# Patient Record
Sex: FEMALE | Race: WHITE | NOT HISPANIC OR LATINO
[De-identification: names, ages, dates, MRNs, and addresses within clinical notes are randomized per-mention and may not be internally consistent; named-entity substitution may affect disease eponyms.]

---

## 2019-06-23 ENCOUNTER — FORM ENCOUNTER (OUTPATIENT)
Age: 59
End: 2019-06-23

## 2020-06-30 ENCOUNTER — FORM ENCOUNTER (OUTPATIENT)
Age: 60
End: 2020-06-30

## 2020-07-16 ENCOUNTER — FORM ENCOUNTER (OUTPATIENT)
Age: 60
End: 2020-07-16

## 2020-09-15 ENCOUNTER — APPOINTMENT (OUTPATIENT)
Dept: ORTHOPEDIC SURGERY | Facility: CLINIC | Age: 60
End: 2020-09-15
Payer: MEDICARE

## 2020-09-15 VITALS
HEIGHT: 63 IN | HEART RATE: 74 BPM | BODY MASS INDEX: 48.55 KG/M2 | WEIGHT: 274 LBS | SYSTOLIC BLOOD PRESSURE: 132 MMHG | DIASTOLIC BLOOD PRESSURE: 74 MMHG | TEMPERATURE: 97.4 F

## 2020-09-15 DIAGNOSIS — Z80.1 FAMILY HISTORY OF MALIGNANT NEOPLASM OF TRACHEA, BRONCHUS AND LUNG: ICD-10-CM

## 2020-09-15 DIAGNOSIS — Z87.39 PERSONAL HISTORY OF OTHER DISEASES OF THE MUSCULOSKELETAL SYSTEM AND CONNECTIVE TISSUE: ICD-10-CM

## 2020-09-15 DIAGNOSIS — Z87.891 PERSONAL HISTORY OF NICOTINE DEPENDENCE: ICD-10-CM

## 2020-09-15 DIAGNOSIS — Z86.39 PERSONAL HISTORY OF OTHER ENDOCRINE, NUTRITIONAL AND METABOLIC DISEASE: ICD-10-CM

## 2020-09-15 DIAGNOSIS — I87.2 VENOUS INSUFFICIENCY (CHRONIC) (PERIPHERAL): ICD-10-CM

## 2020-09-15 PROCEDURE — 73562 X-RAY EXAM OF KNEE 3: CPT | Mod: 50

## 2020-09-15 PROCEDURE — 73521 X-RAY EXAM HIPS BI 2 VIEWS: CPT

## 2020-09-15 PROCEDURE — 99203 OFFICE O/P NEW LOW 30 MIN: CPT

## 2020-09-15 RX ORDER — OMEPRAZOLE 40 MG/1
40 CAPSULE, DELAYED RELEASE ORAL
Refills: 0 | Status: ACTIVE | COMMUNITY

## 2020-09-19 ENCOUNTER — OUTPATIENT (OUTPATIENT)
Dept: OUTPATIENT SERVICES | Facility: HOSPITAL | Age: 60
LOS: 1 days | End: 2020-09-19
Payer: COMMERCIAL

## 2020-09-19 ENCOUNTER — APPOINTMENT (OUTPATIENT)
Dept: CT IMAGING | Facility: CLINIC | Age: 60
End: 2020-09-19
Payer: MEDICARE

## 2020-09-19 ENCOUNTER — RESULT REVIEW (OUTPATIENT)
Age: 60
End: 2020-09-19

## 2020-09-19 DIAGNOSIS — Z00.8 ENCOUNTER FOR OTHER GENERAL EXAMINATION: ICD-10-CM

## 2020-09-19 PROCEDURE — 76376 3D RENDER W/INTRP POSTPROCES: CPT

## 2020-09-19 PROCEDURE — 73700 CT LOWER EXTREMITY W/O DYE: CPT

## 2020-09-19 PROCEDURE — 76376 3D RENDER W/INTRP POSTPROCES: CPT | Mod: 26

## 2020-09-19 PROCEDURE — 73700 CT LOWER EXTREMITY W/O DYE: CPT | Mod: 26,RT

## 2020-11-20 ENCOUNTER — NON-APPOINTMENT (OUTPATIENT)
Age: 60
End: 2020-11-20

## 2020-11-23 ENCOUNTER — APPOINTMENT (OUTPATIENT)
Dept: ORTHOPEDIC SURGERY | Facility: CLINIC | Age: 60
End: 2020-11-23
Payer: MEDICARE

## 2020-11-23 DIAGNOSIS — M25.561 PAIN IN RIGHT KNEE: ICD-10-CM

## 2020-11-23 PROCEDURE — 99213 OFFICE O/P EST LOW 20 MIN: CPT

## 2020-11-23 PROCEDURE — 73562 X-RAY EXAM OF KNEE 3: CPT | Mod: LT

## 2020-12-28 ENCOUNTER — APPOINTMENT (OUTPATIENT)
Dept: ORTHOPEDIC SURGERY | Facility: HOSPITAL | Age: 60
End: 2020-12-28

## 2021-05-10 ENCOUNTER — NON-APPOINTMENT (OUTPATIENT)
Age: 61
End: 2021-05-10

## 2021-05-10 ENCOUNTER — RX RENEWAL (OUTPATIENT)
Age: 61
End: 2021-05-10

## 2021-05-22 ENCOUNTER — APPOINTMENT (OUTPATIENT)
Dept: ORTHOPEDIC SURGERY | Facility: CLINIC | Age: 61
End: 2021-05-22
Payer: MEDICARE

## 2021-05-22 VITALS
HEART RATE: 65 BPM | TEMPERATURE: 98.9 F | DIASTOLIC BLOOD PRESSURE: 72 MMHG | SYSTOLIC BLOOD PRESSURE: 125 MMHG | HEIGHT: 63 IN | WEIGHT: 268 LBS | BODY MASS INDEX: 47.48 KG/M2

## 2021-05-22 DIAGNOSIS — T84.033A MECHANICAL LOOSENING OF INTERNAL LEFT KNEE PROSTHETIC JOINT, INITIAL ENCOUNTER: ICD-10-CM

## 2021-05-22 DIAGNOSIS — T84.032A MECHANICAL LOOSENING OF INTERNAL RIGHT KNEE PROSTHETIC JOINT, INITIAL ENCOUNTER: ICD-10-CM

## 2021-05-22 PROCEDURE — 99213 OFFICE O/P EST LOW 20 MIN: CPT

## 2021-05-22 PROCEDURE — 73562 X-RAY EXAM OF KNEE 3: CPT | Mod: RT

## 2021-05-22 PROCEDURE — 99072 ADDL SUPL MATRL&STAF TM PHE: CPT

## 2021-05-24 PROBLEM — T84.032A MECHANICAL LOOSENING OF INTERNAL RIGHT KNEE PROSTHETIC JOINT, INITIAL ENCOUNTER: Status: ACTIVE | Noted: 2020-09-15

## 2021-06-05 ENCOUNTER — TRANSCRIPTION ENCOUNTER (OUTPATIENT)
Age: 61
End: 2021-06-05

## 2021-06-28 ENCOUNTER — OUTPATIENT (OUTPATIENT)
Dept: OUTPATIENT SERVICES | Facility: HOSPITAL | Age: 61
LOS: 1 days | End: 2021-06-28
Payer: COMMERCIAL

## 2021-06-28 VITALS
WEIGHT: 268.08 LBS | HEIGHT: 63.5 IN | SYSTOLIC BLOOD PRESSURE: 130 MMHG | OXYGEN SATURATION: 96 % | HEART RATE: 64 BPM | TEMPERATURE: 99 F | DIASTOLIC BLOOD PRESSURE: 76 MMHG | RESPIRATION RATE: 16 BRPM

## 2021-06-28 DIAGNOSIS — Z01.818 ENCOUNTER FOR OTHER PREPROCEDURAL EXAMINATION: ICD-10-CM

## 2021-06-28 DIAGNOSIS — Z98.890 OTHER SPECIFIED POSTPROCEDURAL STATES: Chronic | ICD-10-CM

## 2021-06-28 DIAGNOSIS — Z90.710 ACQUIRED ABSENCE OF BOTH CERVIX AND UTERUS: Chronic | ICD-10-CM

## 2021-06-28 DIAGNOSIS — Z96.653 PRESENCE OF ARTIFICIAL KNEE JOINT, BILATERAL: Chronic | ICD-10-CM

## 2021-06-28 DIAGNOSIS — M25.561 PAIN IN RIGHT KNEE: ICD-10-CM

## 2021-06-28 DIAGNOSIS — I83.90 ASYMPTOMATIC VARICOSE VEINS OF UNSPECIFIED LOWER EXTREMITY: Chronic | ICD-10-CM

## 2021-06-28 DIAGNOSIS — Z20.828 CONTACT WITH AND (SUSPECTED) EXPOSURE TO OTHER VIRAL COMMUNICABLE DISEASES: ICD-10-CM

## 2021-06-28 DIAGNOSIS — T84.032A MECHANICAL LOOSENING OF INTERNAL RIGHT KNEE PROSTHETIC JOINT, INITIAL ENCOUNTER: ICD-10-CM

## 2021-06-28 DIAGNOSIS — Z98.891 HISTORY OF UTERINE SCAR FROM PREVIOUS SURGERY: Chronic | ICD-10-CM

## 2021-06-28 LAB
A1C WITH ESTIMATED AVERAGE GLUCOSE RESULT: 6.4 % — HIGH (ref 4–5.6)
ALBUMIN SERPL ELPH-MCNC: 3.4 G/DL — SIGNIFICANT CHANGE UP (ref 3.3–5)
ALP SERPL-CCNC: 120 U/L — SIGNIFICANT CHANGE UP (ref 30–120)
ALT FLD-CCNC: 36 U/L DA — SIGNIFICANT CHANGE UP (ref 10–60)
ANION GAP SERPL CALC-SCNC: 9 MMOL/L — SIGNIFICANT CHANGE UP (ref 5–17)
APTT BLD: 29.2 SEC — SIGNIFICANT CHANGE UP (ref 27.5–35.5)
AST SERPL-CCNC: 29 U/L — SIGNIFICANT CHANGE UP (ref 10–40)
BILIRUB SERPL-MCNC: 0.4 MG/DL — SIGNIFICANT CHANGE UP (ref 0.2–1.2)
BLD GP AB SCN SERPL QL: SIGNIFICANT CHANGE UP
BUN SERPL-MCNC: 15 MG/DL — SIGNIFICANT CHANGE UP (ref 7–23)
CALCIUM SERPL-MCNC: 8.6 MG/DL — SIGNIFICANT CHANGE UP (ref 8.4–10.5)
CHLORIDE SERPL-SCNC: 102 MMOL/L — SIGNIFICANT CHANGE UP (ref 96–108)
CO2 SERPL-SCNC: 30 MMOL/L — SIGNIFICANT CHANGE UP (ref 22–31)
CREAT SERPL-MCNC: 0.83 MG/DL — SIGNIFICANT CHANGE UP (ref 0.5–1.3)
D DIMER BLD IA.RAPID-MCNC: 359 NG/ML DDU — HIGH
ESTIMATED AVERAGE GLUCOSE: 137 MG/DL — HIGH (ref 68–114)
GLUCOSE SERPL-MCNC: 129 MG/DL — HIGH (ref 70–99)
HCT VFR BLD CALC: 38.9 % — SIGNIFICANT CHANGE UP (ref 34.5–45)
HGB BLD-MCNC: 12.6 G/DL — SIGNIFICANT CHANGE UP (ref 11.5–15.5)
INR BLD: 1.07 RATIO — SIGNIFICANT CHANGE UP (ref 0.88–1.16)
MCHC RBC-ENTMCNC: 27.6 PG — SIGNIFICANT CHANGE UP (ref 27–34)
MCHC RBC-ENTMCNC: 32.4 GM/DL — SIGNIFICANT CHANGE UP (ref 32–36)
MCV RBC AUTO: 85.3 FL — SIGNIFICANT CHANGE UP (ref 80–100)
MRSA PCR RESULT.: SIGNIFICANT CHANGE UP
NRBC # BLD: 0 /100 WBCS — SIGNIFICANT CHANGE UP (ref 0–0)
PLATELET # BLD AUTO: 276 K/UL — SIGNIFICANT CHANGE UP (ref 150–400)
POTASSIUM SERPL-MCNC: 4.3 MMOL/L — SIGNIFICANT CHANGE UP (ref 3.5–5.3)
POTASSIUM SERPL-SCNC: 4.3 MMOL/L — SIGNIFICANT CHANGE UP (ref 3.5–5.3)
PROT SERPL-MCNC: 6.5 G/DL — SIGNIFICANT CHANGE UP (ref 6–8.3)
PROTHROM AB SERPL-ACNC: 12.9 SEC — SIGNIFICANT CHANGE UP (ref 10.6–13.6)
RBC # BLD: 4.56 M/UL — SIGNIFICANT CHANGE UP (ref 3.8–5.2)
RBC # FLD: 14.1 % — SIGNIFICANT CHANGE UP (ref 10.3–14.5)
S AUREUS DNA NOSE QL NAA+PROBE: SIGNIFICANT CHANGE UP
SODIUM SERPL-SCNC: 141 MMOL/L — SIGNIFICANT CHANGE UP (ref 135–145)
WBC # BLD: 9.49 K/UL — SIGNIFICANT CHANGE UP (ref 3.8–10.5)
WBC # FLD AUTO: 9.49 K/UL — SIGNIFICANT CHANGE UP (ref 3.8–10.5)

## 2021-06-28 PROCEDURE — G0463: CPT

## 2021-06-28 PROCEDURE — 93005 ELECTROCARDIOGRAM TRACING: CPT

## 2021-06-28 PROCEDURE — 93010 ELECTROCARDIOGRAM REPORT: CPT

## 2021-06-28 RX ORDER — DEXTROSE 50 % IN WATER 50 %
25 SYRINGE (ML) INTRAVENOUS ONCE
Refills: 0 | Status: DISCONTINUED | OUTPATIENT
Start: 2021-07-12 | End: 2021-07-14

## 2021-06-28 RX ORDER — DEXTROSE 50 % IN WATER 50 %
15 SYRINGE (ML) INTRAVENOUS ONCE
Refills: 0 | Status: DISCONTINUED | OUTPATIENT
Start: 2021-07-12 | End: 2021-07-14

## 2021-06-28 RX ORDER — GLUCAGON INJECTION, SOLUTION 0.5 MG/.1ML
1 INJECTION, SOLUTION SUBCUTANEOUS ONCE
Refills: 0 | Status: DISCONTINUED | OUTPATIENT
Start: 2021-07-12 | End: 2021-07-14

## 2021-06-28 RX ORDER — DEXTROSE 50 % IN WATER 50 %
12.5 SYRINGE (ML) INTRAVENOUS ONCE
Refills: 0 | Status: DISCONTINUED | OUTPATIENT
Start: 2021-07-12 | End: 2021-07-14

## 2021-06-28 NOTE — H&P PST ADULT - NSICDXPROBLEM_GEN_ALL_CORE_FT
PROBLEM DIAGNOSES  Problem: Pain in right knee  Assessment and Plan: FULL REVISION RIGHT TOTAL KNEE AUGMENTED PATELLSA SYSTEM  MEDICAL, PULMONARY, CARDIAC, VASCULAR CLEARANCES

## 2021-06-28 NOTE — H&P PST ADULT - NSICDXFAMILYHX_GEN_ALL_CORE_FT
FAMILY HISTORY:  Father  Still living? No  Family hx of melanoma, Age at diagnosis: Age Unknown    Mother  Still living? No  FHx: lung cancer, Age at diagnosis: Age Unknown    Sibling  Still living? No  Family hx of lung cancer, Age at diagnosis: Age Unknown

## 2021-06-28 NOTE — H&P PST ADULT - VENOUS THROMBOEMBOLISM CURRENT STATUS
(1) serious lung disease, including pneumonia (within 1 month)/(1) varicose veins/(1) other risk factor (includes escalating BMI, pack-years of smoking, diabetes requiring insulin, chemotherapy, female gender and length of surgery)

## 2021-06-28 NOTE — H&P PST ADULT - VASCULAR DETAILS
LE/feet warm to touch, unable to palpate pulses, numerous varicosities.  Recent dopplers done by vascular surgeon in April and will be forwarded.

## 2021-06-28 NOTE — H&P PST ADULT - HISTORY OF PRESENT ILLNESS
60 yo female reports 9 month history of right knee pain that began after a fall.  Patient had a B/Lt TKR 16 years ago.  Patient states that she has significant bone loss in the right knee.  As of the past week pain has become constant and ROM is limited.  No current analgesics.

## 2021-06-28 NOTE — H&P PST ADULT - NSICDXPASTSURGICALHX_GEN_ALL_CORE_FT
PAST SURGICAL HISTORY:  H/O:      H/O: hysterectomy     History of myringotomy 2006    S/P FESS (functional endoscopic sinus surgery) 2007    Status post bilateral knee replacements      PAST SURGICAL HISTORY:  H/O:      H/O: hysterectomy     History of myringotomy 2006    S/P FESS (functional endoscopic sinus surgery) 2007    Status post bilateral knee replacements     Varicose vein of leg right leg, excised 2021 thigh to knee

## 2021-06-28 NOTE — H&P PST ADULT - NSICDXPASTMEDICALHX_GEN_ALL_CORE_FT
PAST MEDICAL HISTORY:  Acid reflux     History of fibromyalgia     History of varicose veins     Hypothyroid     Osteoarthritis     Pneumonia due to COVID-19 virus 3/2021  inpatient at [facility]    Type 2 diabetes mellitus     Uterine hemorrhage     Venous insufficiency of leg      PAST MEDICAL HISTORY:  Acid reflux     History of fibromyalgia     History of varicose veins     Hypothyroid     Osteoarthritis     Pneumonia due to COVID-19 virus 3/2021  inpatient at [facility]    Severe obesity (BMI >= 40)     Type 2 diabetes mellitus     Uterine hemorrhage     Venous insufficiency of leg

## 2021-06-28 NOTE — H&P PST ADULT - MUSCULOSKELETAL
details… no calf tenderness/decreased ROM/decreased ROM due to pain/diminished strength detailed exam

## 2021-07-01 ENCOUNTER — FORM ENCOUNTER (OUTPATIENT)
Age: 61
End: 2021-07-01

## 2021-07-07 PROBLEM — E66.01 MORBID (SEVERE) OBESITY DUE TO EXCESS CALORIES: Chronic | Status: ACTIVE | Noted: 2021-06-28

## 2021-07-07 PROBLEM — Z86.79 PERSONAL HISTORY OF OTHER DISEASES OF THE CIRCULATORY SYSTEM: Chronic | Status: ACTIVE | Noted: 2021-06-28

## 2021-07-07 PROBLEM — E11.9 TYPE 2 DIABETES MELLITUS WITHOUT COMPLICATIONS: Chronic | Status: ACTIVE | Noted: 2021-06-28

## 2021-07-07 PROBLEM — M19.90 UNSPECIFIED OSTEOARTHRITIS, UNSPECIFIED SITE: Chronic | Status: ACTIVE | Noted: 2021-06-28

## 2021-07-07 PROBLEM — I87.2 VENOUS INSUFFICIENCY (CHRONIC) (PERIPHERAL): Chronic | Status: ACTIVE | Noted: 2021-06-28

## 2021-07-07 PROBLEM — N93.9 ABNORMAL UTERINE AND VAGINAL BLEEDING, UNSPECIFIED: Chronic | Status: ACTIVE | Noted: 2021-06-28

## 2021-07-07 PROBLEM — K21.9 GASTRO-ESOPHAGEAL REFLUX DISEASE WITHOUT ESOPHAGITIS: Chronic | Status: ACTIVE | Noted: 2021-06-28

## 2021-07-07 PROBLEM — E03.9 HYPOTHYROIDISM, UNSPECIFIED: Chronic | Status: ACTIVE | Noted: 2021-06-28

## 2021-07-07 PROBLEM — Z87.39 PERSONAL HISTORY OF OTHER DISEASES OF THE MUSCULOSKELETAL SYSTEM AND CONNECTIVE TISSUE: Chronic | Status: ACTIVE | Noted: 2021-06-28

## 2021-07-07 PROBLEM — U07.1 COVID-19: Chronic | Status: ACTIVE | Noted: 2021-06-28

## 2021-07-09 ENCOUNTER — OUTPATIENT (OUTPATIENT)
Dept: OUTPATIENT SERVICES | Facility: HOSPITAL | Age: 61
LOS: 1 days | End: 2021-07-09
Payer: COMMERCIAL

## 2021-07-09 DIAGNOSIS — Z98.890 OTHER SPECIFIED POSTPROCEDURAL STATES: Chronic | ICD-10-CM

## 2021-07-09 DIAGNOSIS — Z98.891 HISTORY OF UTERINE SCAR FROM PREVIOUS SURGERY: Chronic | ICD-10-CM

## 2021-07-09 DIAGNOSIS — Z90.710 ACQUIRED ABSENCE OF BOTH CERVIX AND UTERUS: Chronic | ICD-10-CM

## 2021-07-09 DIAGNOSIS — Z96.653 PRESENCE OF ARTIFICIAL KNEE JOINT, BILATERAL: Chronic | ICD-10-CM

## 2021-07-09 DIAGNOSIS — Z20.828 CONTACT WITH AND (SUSPECTED) EXPOSURE TO OTHER VIRAL COMMUNICABLE DISEASES: ICD-10-CM

## 2021-07-09 DIAGNOSIS — I83.90 ASYMPTOMATIC VARICOSE VEINS OF UNSPECIFIED LOWER EXTREMITY: Chronic | ICD-10-CM

## 2021-07-09 LAB — SARS-COV-2 RNA SPEC QL NAA+PROBE: SIGNIFICANT CHANGE UP

## 2021-07-09 PROCEDURE — U0003: CPT

## 2021-07-09 PROCEDURE — U0005: CPT

## 2021-07-09 NOTE — PATIENT PROFILE ADULT - FOOD INSECURITY
DNR Order/Artificial nutrition trial/Intubation trial/IV fluid trial No blood draws/No artificial nutrition/Comfort measures only/No antibiotics/DNR Order/No IV fluids no

## 2021-07-11 ENCOUNTER — TRANSCRIPTION ENCOUNTER (OUTPATIENT)
Age: 61
End: 2021-07-11

## 2021-07-12 ENCOUNTER — APPOINTMENT (OUTPATIENT)
Dept: ORTHOPEDIC SURGERY | Facility: HOSPITAL | Age: 61
End: 2021-07-12

## 2021-07-12 ENCOUNTER — INPATIENT (INPATIENT)
Facility: HOSPITAL | Age: 61
LOS: 1 days | Discharge: HOME | DRG: 467 | End: 2021-07-14
Attending: ORTHOPAEDIC SURGERY | Admitting: ORTHOPAEDIC SURGERY
Payer: COMMERCIAL

## 2021-07-12 ENCOUNTER — RESULT REVIEW (OUTPATIENT)
Age: 61
End: 2021-07-12

## 2021-07-12 VITALS
OXYGEN SATURATION: 100 % | HEIGHT: 63 IN | HEART RATE: 62 BPM | WEIGHT: 264.78 LBS | TEMPERATURE: 98 F | SYSTOLIC BLOOD PRESSURE: 134 MMHG | RESPIRATION RATE: 17 BRPM | DIASTOLIC BLOOD PRESSURE: 55 MMHG

## 2021-07-12 DIAGNOSIS — Z90.710 ACQUIRED ABSENCE OF BOTH CERVIX AND UTERUS: Chronic | ICD-10-CM

## 2021-07-12 DIAGNOSIS — M25.561 PAIN IN RIGHT KNEE: ICD-10-CM

## 2021-07-12 DIAGNOSIS — Z98.890 OTHER SPECIFIED POSTPROCEDURAL STATES: Chronic | ICD-10-CM

## 2021-07-12 DIAGNOSIS — Z29.9 ENCOUNTER FOR PROPHYLACTIC MEASURES, UNSPECIFIED: ICD-10-CM

## 2021-07-12 DIAGNOSIS — E78.5 HYPERLIPIDEMIA, UNSPECIFIED: ICD-10-CM

## 2021-07-12 DIAGNOSIS — R73.03 PREDIABETES: ICD-10-CM

## 2021-07-12 DIAGNOSIS — Z96.653 PRESENCE OF ARTIFICIAL KNEE JOINT, BILATERAL: Chronic | ICD-10-CM

## 2021-07-12 DIAGNOSIS — T84.032A MECHANICAL LOOSENING OF INTERNAL RIGHT KNEE PROSTHETIC JOINT, INITIAL ENCOUNTER: ICD-10-CM

## 2021-07-12 DIAGNOSIS — K21.9 GASTRO-ESOPHAGEAL REFLUX DISEASE WITHOUT ESOPHAGITIS: ICD-10-CM

## 2021-07-12 DIAGNOSIS — T84.018A BROKEN INTERNAL JOINT PROSTHESIS, OTHER SITE, INITIAL ENCOUNTER: ICD-10-CM

## 2021-07-12 DIAGNOSIS — E03.9 HYPOTHYROIDISM, UNSPECIFIED: ICD-10-CM

## 2021-07-12 DIAGNOSIS — I83.90 ASYMPTOMATIC VARICOSE VEINS OF UNSPECIFIED LOWER EXTREMITY: Chronic | ICD-10-CM

## 2021-07-12 DIAGNOSIS — Z98.891 HISTORY OF UTERINE SCAR FROM PREVIOUS SURGERY: Chronic | ICD-10-CM

## 2021-07-12 DIAGNOSIS — M79.7 FIBROMYALGIA: ICD-10-CM

## 2021-07-12 LAB
GLUCOSE BLDC GLUCOMTR-MCNC: 105 MG/DL — HIGH (ref 70–99)
GLUCOSE BLDC GLUCOMTR-MCNC: 152 MG/DL — HIGH (ref 70–99)
HCT VFR BLD CALC: 36.6 % — SIGNIFICANT CHANGE UP (ref 34.5–45)
HGB BLD-MCNC: 11.9 G/DL — SIGNIFICANT CHANGE UP (ref 11.5–15.5)
MCHC RBC-ENTMCNC: 27.4 PG — SIGNIFICANT CHANGE UP (ref 27–34)
MCHC RBC-ENTMCNC: 32.5 GM/DL — SIGNIFICANT CHANGE UP (ref 32–36)
MCV RBC AUTO: 84.1 FL — SIGNIFICANT CHANGE UP (ref 80–100)
NRBC # BLD: 0 /100 WBCS — SIGNIFICANT CHANGE UP (ref 0–0)
PLATELET # BLD AUTO: 286 K/UL — SIGNIFICANT CHANGE UP (ref 150–400)
RBC # BLD: 4.35 M/UL — SIGNIFICANT CHANGE UP (ref 3.8–5.2)
RBC # FLD: 13.7 % — SIGNIFICANT CHANGE UP (ref 10.3–14.5)
WBC # BLD: 20.41 K/UL — HIGH (ref 3.8–10.5)
WBC # FLD AUTO: 20.41 K/UL — HIGH (ref 3.8–10.5)

## 2021-07-12 PROCEDURE — 88305 TISSUE EXAM BY PATHOLOGIST: CPT | Mod: 26

## 2021-07-12 PROCEDURE — 88300 SURGICAL PATH GROSS: CPT | Mod: 26

## 2021-07-12 PROCEDURE — 27487 REVISE/REPLACE KNEE JOINT: CPT | Mod: RT

## 2021-07-12 PROCEDURE — 73560 X-RAY EXAM OF KNEE 1 OR 2: CPT | Mod: 26,RT

## 2021-07-12 PROCEDURE — 99222 1ST HOSP IP/OBS MODERATE 55: CPT

## 2021-07-12 PROCEDURE — 88311 DECALCIFY TISSUE: CPT | Mod: 26

## 2021-07-12 PROCEDURE — 27487 REVISE/REPLACE KNEE JOINT: CPT | Mod: AS,RT

## 2021-07-12 RX ORDER — OXYCODONE HYDROCHLORIDE 5 MG/1
10 TABLET ORAL
Refills: 0 | Status: DISCONTINUED | OUTPATIENT
Start: 2021-07-12 | End: 2021-07-14

## 2021-07-12 RX ORDER — TRANEXAMIC ACID 100 MG/ML
1000 INJECTION, SOLUTION INTRAVENOUS ONCE
Refills: 0 | Status: DISCONTINUED | OUTPATIENT
Start: 2021-07-12 | End: 2021-07-12

## 2021-07-12 RX ORDER — PANTOPRAZOLE SODIUM 20 MG/1
40 TABLET, DELAYED RELEASE ORAL
Refills: 0 | Status: DISCONTINUED | OUTPATIENT
Start: 2021-07-12 | End: 2021-07-14

## 2021-07-12 RX ORDER — APIXABAN 2.5 MG/1
5 TABLET, FILM COATED ORAL EVERY 12 HOURS
Refills: 0 | Status: DISCONTINUED | OUTPATIENT
Start: 2021-07-14 | End: 2021-07-14

## 2021-07-12 RX ORDER — DEXTROSE 50 % IN WATER 50 %
15 SYRINGE (ML) INTRAVENOUS ONCE
Refills: 0 | Status: DISCONTINUED | OUTPATIENT
Start: 2021-07-12 | End: 2021-07-14

## 2021-07-12 RX ORDER — APIXABAN 2.5 MG/1
2.5 TABLET, FILM COATED ORAL EVERY 12 HOURS
Refills: 0 | Status: COMPLETED | OUTPATIENT
Start: 2021-07-13 | End: 2021-07-13

## 2021-07-12 RX ORDER — FLUOXETINE HCL 10 MG
20 CAPSULE ORAL DAILY
Refills: 0 | Status: DISCONTINUED | OUTPATIENT
Start: 2021-07-12 | End: 2021-07-14

## 2021-07-12 RX ORDER — GLUCAGON INJECTION, SOLUTION 0.5 MG/.1ML
1 INJECTION, SOLUTION SUBCUTANEOUS ONCE
Refills: 0 | Status: DISCONTINUED | OUTPATIENT
Start: 2021-07-12 | End: 2021-07-14

## 2021-07-12 RX ORDER — CEFAZOLIN SODIUM 1 G
2000 VIAL (EA) INJECTION EVERY 8 HOURS
Refills: 0 | Status: DISCONTINUED | OUTPATIENT
Start: 2021-07-12 | End: 2021-07-12

## 2021-07-12 RX ORDER — ACETAMINOPHEN 500 MG
1000 TABLET ORAL ONCE
Refills: 0 | Status: COMPLETED | OUTPATIENT
Start: 2021-07-12 | End: 2021-07-12

## 2021-07-12 RX ORDER — SODIUM CHLORIDE 9 MG/ML
500 INJECTION INTRAMUSCULAR; INTRAVENOUS; SUBCUTANEOUS ONCE
Refills: 0 | Status: COMPLETED | OUTPATIENT
Start: 2021-07-12 | End: 2021-07-12

## 2021-07-12 RX ORDER — TRANEXAMIC ACID 100 MG/ML
1 INJECTION, SOLUTION INTRAVENOUS ONCE
Refills: 0 | Status: COMPLETED | OUTPATIENT
Start: 2021-07-12 | End: 2021-07-12

## 2021-07-12 RX ORDER — SODIUM CHLORIDE 9 MG/ML
1000 INJECTION, SOLUTION INTRAVENOUS
Refills: 0 | Status: DISCONTINUED | OUTPATIENT
Start: 2021-07-12 | End: 2021-07-14

## 2021-07-12 RX ORDER — ONDANSETRON 8 MG/1
4 TABLET, FILM COATED ORAL EVERY 6 HOURS
Refills: 0 | Status: DISCONTINUED | OUTPATIENT
Start: 2021-07-12 | End: 2021-07-14

## 2021-07-12 RX ORDER — CELECOXIB 200 MG/1
100 CAPSULE ORAL EVERY 12 HOURS
Refills: 0 | Status: DISCONTINUED | OUTPATIENT
Start: 2021-07-13 | End: 2021-07-14

## 2021-07-12 RX ORDER — ONDANSETRON 8 MG/1
4 TABLET, FILM COATED ORAL ONCE
Refills: 0 | Status: DISCONTINUED | OUTPATIENT
Start: 2021-07-12 | End: 2021-07-12

## 2021-07-12 RX ORDER — METFORMIN HYDROCHLORIDE 850 MG/1
750 TABLET ORAL DAILY
Refills: 0 | Status: DISCONTINUED | OUTPATIENT
Start: 2021-07-13 | End: 2021-07-14

## 2021-07-12 RX ORDER — DEXTROSE 50 % IN WATER 50 %
25 SYRINGE (ML) INTRAVENOUS ONCE
Refills: 0 | Status: DISCONTINUED | OUTPATIENT
Start: 2021-07-12 | End: 2021-07-14

## 2021-07-12 RX ORDER — HYDROMORPHONE HYDROCHLORIDE 2 MG/ML
0.5 INJECTION INTRAMUSCULAR; INTRAVENOUS; SUBCUTANEOUS
Refills: 0 | Status: DISCONTINUED | OUTPATIENT
Start: 2021-07-12 | End: 2021-07-14

## 2021-07-12 RX ORDER — APREPITANT 80 MG/1
40 CAPSULE ORAL ONCE
Refills: 0 | Status: COMPLETED | OUTPATIENT
Start: 2021-07-12 | End: 2021-07-12

## 2021-07-12 RX ORDER — CHLORHEXIDINE GLUCONATE 213 G/1000ML
1 SOLUTION TOPICAL ONCE
Refills: 0 | Status: COMPLETED | OUTPATIENT
Start: 2021-07-12 | End: 2021-07-12

## 2021-07-12 RX ORDER — OXYCODONE HYDROCHLORIDE 5 MG/1
5 TABLET ORAL
Refills: 0 | Status: DISCONTINUED | OUTPATIENT
Start: 2021-07-12 | End: 2021-07-14

## 2021-07-12 RX ORDER — DEXTROSE 50 % IN WATER 50 %
12.5 SYRINGE (ML) INTRAVENOUS ONCE
Refills: 0 | Status: DISCONTINUED | OUTPATIENT
Start: 2021-07-12 | End: 2021-07-14

## 2021-07-12 RX ORDER — CEFAZOLIN SODIUM 1 G
3000 VIAL (EA) INJECTION EVERY 8 HOURS
Refills: 0 | Status: COMPLETED | OUTPATIENT
Start: 2021-07-12 | End: 2021-07-13

## 2021-07-12 RX ORDER — INSULIN LISPRO 100/ML
VIAL (ML) SUBCUTANEOUS
Refills: 0 | Status: DISCONTINUED | OUTPATIENT
Start: 2021-07-12 | End: 2021-07-14

## 2021-07-12 RX ORDER — SODIUM CHLORIDE 9 MG/ML
1000 INJECTION, SOLUTION INTRAVENOUS
Refills: 0 | Status: DISCONTINUED | OUTPATIENT
Start: 2021-07-12 | End: 2021-07-13

## 2021-07-12 RX ORDER — MAGNESIUM HYDROXIDE 400 MG/1
30 TABLET, CHEWABLE ORAL DAILY
Refills: 0 | Status: DISCONTINUED | OUTPATIENT
Start: 2021-07-12 | End: 2021-07-14

## 2021-07-12 RX ORDER — SODIUM CHLORIDE 9 MG/ML
1000 INJECTION, SOLUTION INTRAVENOUS
Refills: 0 | Status: DISCONTINUED | OUTPATIENT
Start: 2021-07-12 | End: 2021-07-12

## 2021-07-12 RX ORDER — POLYETHYLENE GLYCOL 3350 17 G/17G
17 POWDER, FOR SOLUTION ORAL AT BEDTIME
Refills: 0 | Status: DISCONTINUED | OUTPATIENT
Start: 2021-07-12 | End: 2021-07-14

## 2021-07-12 RX ORDER — ACETAMINOPHEN 500 MG
1000 TABLET ORAL EVERY 8 HOURS
Refills: 0 | Status: DISCONTINUED | OUTPATIENT
Start: 2021-07-13 | End: 2021-07-14

## 2021-07-12 RX ORDER — HYDROMORPHONE HYDROCHLORIDE 2 MG/ML
0.5 INJECTION INTRAMUSCULAR; INTRAVENOUS; SUBCUTANEOUS
Refills: 0 | Status: DISCONTINUED | OUTPATIENT
Start: 2021-07-12 | End: 2021-07-12

## 2021-07-12 RX ORDER — LISINOPRIL 2.5 MG/1
5 TABLET ORAL DAILY
Refills: 0 | Status: DISCONTINUED | OUTPATIENT
Start: 2021-07-14 | End: 2021-07-14

## 2021-07-12 RX ORDER — ACETAMINOPHEN 500 MG
1000 TABLET ORAL ONCE
Refills: 0 | Status: COMPLETED | OUTPATIENT
Start: 2021-07-13 | End: 2021-07-13

## 2021-07-12 RX ORDER — LEVOTHYROXINE SODIUM 125 MCG
125 TABLET ORAL DAILY
Refills: 0 | Status: DISCONTINUED | OUTPATIENT
Start: 2021-07-12 | End: 2021-07-14

## 2021-07-12 RX ORDER — ATORVASTATIN CALCIUM 80 MG/1
40 TABLET, FILM COATED ORAL AT BEDTIME
Refills: 0 | Status: DISCONTINUED | OUTPATIENT
Start: 2021-07-12 | End: 2021-07-14

## 2021-07-12 RX ORDER — CEFAZOLIN SODIUM 1 G
3000 VIAL (EA) INJECTION ONCE
Refills: 0 | Status: COMPLETED | OUTPATIENT
Start: 2021-07-12 | End: 2021-07-12

## 2021-07-12 RX ORDER — SENNA PLUS 8.6 MG/1
2 TABLET ORAL AT BEDTIME
Refills: 0 | Status: DISCONTINUED | OUTPATIENT
Start: 2021-07-12 | End: 2021-07-14

## 2021-07-12 RX ADMIN — CHLORHEXIDINE GLUCONATE 1 APPLICATION(S): 213 SOLUTION TOPICAL at 11:06

## 2021-07-12 RX ADMIN — ATORVASTATIN CALCIUM 40 MILLIGRAM(S): 80 TABLET, FILM COATED ORAL at 22:21

## 2021-07-12 RX ADMIN — HYDROMORPHONE HYDROCHLORIDE 0.5 MILLIGRAM(S): 2 INJECTION INTRAMUSCULAR; INTRAVENOUS; SUBCUTANEOUS at 19:27

## 2021-07-12 RX ADMIN — Medication 400 MILLIGRAM(S): at 19:37

## 2021-07-12 RX ADMIN — Medication 1000 MILLIGRAM(S): at 20:05

## 2021-07-12 RX ADMIN — SODIUM CHLORIDE 500 MILLILITER(S): 9 INJECTION INTRAMUSCULAR; INTRAVENOUS; SUBCUTANEOUS at 18:28

## 2021-07-12 RX ADMIN — APREPITANT 40 MILLIGRAM(S): 80 CAPSULE ORAL at 11:05

## 2021-07-12 RX ADMIN — Medication 200 MILLIGRAM(S): at 22:19

## 2021-07-12 RX ADMIN — HYDROMORPHONE HYDROCHLORIDE 0.5 MILLIGRAM(S): 2 INJECTION INTRAMUSCULAR; INTRAVENOUS; SUBCUTANEOUS at 19:45

## 2021-07-12 RX ADMIN — OXYCODONE HYDROCHLORIDE 5 MILLIGRAM(S): 5 TABLET ORAL at 23:46

## 2021-07-12 RX ADMIN — HYDROMORPHONE HYDROCHLORIDE 0.5 MILLIGRAM(S): 2 INJECTION INTRAMUSCULAR; INTRAVENOUS; SUBCUTANEOUS at 19:06

## 2021-07-12 RX ADMIN — SODIUM CHLORIDE 100 MILLILITER(S): 9 INJECTION, SOLUTION INTRAVENOUS at 22:19

## 2021-07-12 RX ADMIN — SODIUM CHLORIDE 75 MILLILITER(S): 9 INJECTION, SOLUTION INTRAVENOUS at 18:30

## 2021-07-12 NOTE — PHYSICAL THERAPY INITIAL EVALUATION ADULT - ADDITIONAL COMMENTS
Pt lives in private home with daughter and . Home has 3 YAZMIN and no steps inside. Pt was independent prior to surgery. Pt has RW and commode.

## 2021-07-12 NOTE — PHYSICAL THERAPY INITIAL EVALUATION ADULT - PERTINENT HX OF CURRENT PROBLEM, REHAB EVAL
62 yo female reports 9 month history of right knee pain that began after a fall.  Patient had a B/Lt TKR 16 years ago.  Patient states that she has significant bone loss in the right knee.  As of the past week pain has become constant and ROM is limited.  No current analgesics.

## 2021-07-12 NOTE — CONSULT NOTE ADULT - ASSESSMENT
Asymptomatic 60 y/o F with PMH of DM type 2, Dyslipidemia, Hypothyroidism, COVID-19 PNA 4 months ago, Chronic Venous Insufficiency s/p right leg phlebectomy in April, Fibromyalgia, GERD, OA, and Morbid Obesity.  Asymptomatic post-op 62 y/o F with PMH of DM type 2, Dyslipidemia, Hypothyroidism, COVID-19 PNA 4 months ago, Chronic Venous Insufficiency s/p right leg phlebectomy in April, Fibromyalgia, GERD, OA, and Morbid Obesity.

## 2021-07-12 NOTE — CONSULT NOTE ADULT - SUBJECTIVE AND OBJECTIVE BOX
This is a 60 y/o F with PMH of DM type 2, Dyslipidemia, Hypothyroidism, COVID PNA 4 months ago, Chronic Venous Insufficiency s/p right leg phlebectomy in April, Fibromyalgia, GERD, OA, and Morbid Obesity who presented for revision of right total knee arthroplasty. Patient states that she had both knees replaced about 16 y ago, no problems till she fell on her knee in the backyard a year ago, since then she was having right knee mild pain on walking/weight bearing, better on rest, gradually was getting worse, but she was able to tolerate it without pain medications. About 3 weeks ago got much worse, severe specially on walking, started to limb, seen by ortho and was scheduled for today. Routine post-op medical evaluation was requested. Patient denies any chest pain, SOB, palpitations, dizziness, headache, paranesthesias (other than right leg), or focal muscle weakness.          ALLERGIES:   No Known Allergies        Intolerances:  Augmentin (Vomiting)            PAST MEDICAL & SURGICAL HISTORY:    Pneumonia due to COVID-19 virus  3/2021  inpatient at [facility]    Type 2 diabetes mellitus    Hypothyroid    Acid reflux    Venous insufficiency of leg    History of varicose veins    History of fibromyalgia    Osteoarthritis    Uterine hemorrhage    Severe obesity (BMI &gt;= 40)    H/O:     H/O: hysterectomy    Status post bilateral knee replacements      S/P FESS (functional endoscopic sinus surgery)      History of myringotomy      Varicose vein of leg  right leg, excised 2021 thigh to knee            SOCIAL HISTORY:     , lives with  & daughter, former smoker, quit about 40 y ago, used to smoke 1 PPD for about 12 years, no ETOH, no drug abuse.          FAMILY HISTORY:    FHx: lung cancer (Mother)    Family hx of lung cancer (Sibling)    Family hx of melanoma (Father)          MEDICATIONS  (STANDING):    atorvastatin 40 milliGRAM(s) Oral at bedtime  ceFAZolin   IVPB 3000 milliGRAM(s) IV Intermittent every 8 hours  dextrose 40% Gel 15 Gram(s) Oral once  dextrose 40% Gel 15 Gram(s) Oral once  dextrose 5%. 1000 milliLiter(s) (50 mL/Hr) IV Continuous <Continuous>  dextrose 5%. 1000 milliLiter(s) (100 mL/Hr) IV Continuous <Continuous>  dextrose 50% Injectable 25 Gram(s) IV Push once  dextrose 50% Injectable 12.5 Gram(s) IV Push once  dextrose 50% Injectable 25 Gram(s) IV Push once  dextrose 50% Injectable 25 Gram(s) IV Push once  dextrose 50% Injectable 12.5 Gram(s) IV Push once  dextrose 50% Injectable 25 Gram(s) IV Push once  FLUoxetine 20 milliGRAM(s) Oral daily  glucagon  Injectable 1 milliGRAM(s) IntraMuscular once  glucagon  Injectable 1 milliGRAM(s) IntraMuscular once  insulin lispro (ADMELOG) corrective regimen sliding scale   SubCutaneous three times a day before meals  lactated ringers. 1000 milliLiter(s) (100 mL/Hr) IV Continuous <Continuous>  lactated ringers. 1000 milliLiter(s) (75 mL/Hr) IV Continuous <Continuous>  levothyroxine 125 MICROGram(s) Oral daily  metFORMIN 750 milliGRAM(s) Oral daily  pantoprazole    Tablet 40 milliGRAM(s) Oral before breakfast  polyethylene glycol 3350 17 Gram(s) Oral at bedtime  pregabalin 100 milliGRAM(s) Oral daily  pregabalin 50 milliGRAM(s) Oral <User Schedule>  senna 2 Tablet(s) Oral at bedtime          MEDICATIONS  (PRN):    HYDROmorphone  Injectable 0.5 milliGRAM(s) IV Push every 10 minutes PRN Moderate Pain (4 - 6)  HYDROmorphone  Injectable 0.5 milliGRAM(s) IV Push every 3 hours PRN breakthrough pain  magnesium hydroxide Suspension 30 milliLiter(s) Oral daily PRN Constipation  ondansetron Injectable 4 milliGRAM(s) IV Push once PRN Nausea and/or Vomiting  ondansetron Injectable 4 milliGRAM(s) IV Push every 6 hours PRN Nausea and/or Vomiting  oxyCODONE    IR 5 milliGRAM(s) Oral every 3 hours PRN Moderate Pain (4 - 6)  oxyCODONE    IR 10 milliGRAM(s) Oral every 3 hours PRN Severe Pain (7 - 10)          HOME MEDICATIONS:    atorvastatin 40 mg oral tablet: 1 tab(s) orally once a day (2021 10:47)  FLUoxetine 20 mg oral tablet: 1 tab(s) orally once a day (2021 10:47)  levothyroxine 125 mcg (0.125 mg) oral tablet: 1 tab(s) orally once a day (2021 10:47)  lisinopril 5 mg oral tablet: 1 tab(s) orally once a day (2021 10:47)  Lyrica 50 mg oral capsule: 1 cap(s) orally 3 times a day  2 in am and 1 in pm (2021 10:47)  metFORMIN 750 mg oral tablet, extended release: 1 tab(s) orally once a day (2021 10:47)  omeprazole 40 mg oral delayed release capsule: 1 cap(s) orally once a day (2021 10:47)  Vitamin B12 1000 mcg oral tablet: 1 tab(s) orally 3 times a week (2021 10:47)  Vitamin D3 5000 intl units (125 mcg)/mL oral liquid:  (2021 10:47)      REVIEW OF SYSTEMS:    CONSTITUTIONAL: No fever or chills.  EYES: No eye pain, visual disturbances, or discharge.  ENMT:  No difficulty hearing, vertigo, sinus or throat pain.  NECK: No pain or stiffness.	  RESPIRATORY: No cough, wheezing, or hemoptysis; No shortness of breath.  CARDIOVASCULAR: No chest pain, palpitations, dizziness, or leg swelling.  GASTROINTESTINAL: No abdominal pain, no nausea, vomiting, or hematemesis; No diarrhea or Change in bowel habits. No melena or hematochezia.  GENITOURINARY: No dysuria, frequency, hematuria, or incontinence.  NEUROLOGICAL: No headaches, focal muscle weakness, numbness, or tremors.  SKIN: No itching, burning or rashes.  MUSCULOSKELETAL: No joint swelling or pain.  PSYCHIATRIC: No depression, anxiety, or agitation.  HEME/LYMPH: No easy bruising, bleeding gums, or nose bleed.  ALLERGY AND IMMUNOLOGIC: No hives or eczema.            VITAL SIGNS:  Vital Signs Last 24 Hrs  T(C): 36.7 (2021 21:33), Max: 36.7 (2021 21:00)  T(F): 98 (2021 21:33), Max: 98 (2021 21:00)  HR: 67 (:33) (62 - 89)  BP: 128/72 (2021 21:33) (112/83 - 180/72)  BP(mean): --  RR: 20 (2021 21:33) (14 - 24)  SpO2: 96% (:33) (95% - 100%)      PHYSICAL EXAM:  		  GENERAL: NAD, well-groomed, well-developed.  HEAD:  Atraumatic, Norm cephalic.  EYES: PERRLA, conjunctiva clear.  ENMT: no nasal discharge, no elinor-pharyngeal erythema or exudates, MMM.   NECK: Supple, No JVD.  NERVOUS SYSTEM:  Alert & oriented X3, neurologically intact grossly.  CHEST/LUNG: Good air entry B/L, no rales, rhonchi, or wheezing.  HEART: Normal S1 & S2, no murmurs, or extra sounds.  ABDOMEN: Soft, non-tender, non-distended; bowel sounds present, no palpable masses or organomegaly.  EXTREMITIES:  No clubbing, cyanosis, or edema.  VASCULAR: 2+ radial, DPA / PTA pulses B/L.  SKIN: No rashes or lesions.  PSYCH: normal affect & behavior.          LABs:    Complete Blood Count (21 @ 09:32)   Nucleated RBC: 0 /100 WBCs   WBC Count: 9.49 K/uL   RBC Count: 4.56 M/uL   Hemoglobin: 12.6 g/dL   Hematocrit: 38.9 %   Mean Cell Volume: 85.3 fl   Mean Cell Hemoglobin: 27.6 pg   Mean Cell Hemoglobin Conc: 32.4 gm/dL   Red Cell Distrib Width: 14.1 %   Platelet Count - Automated: 276 K/uL                          11.9   20.41 )-----------( 286      ( 2021 20:08 )             36.6       Comprehensive Metabolic Panel (21 @ 09:32)   Sodium, Serum: 141 mmol/L   Potassium, Serum: 4.3 mmol/L   Chloride, Serum: 102 mmol/L   Carbon Dioxide, Serum: 30 mmol/L   Anion Gap, Serum: 9 mmol/L   Blood Urea Nitrogen, Serum: 15 mg/dL   Creatinine, Serum: 0.83 mg/dL   Glucose, Serum: 129 mg/dL   Calcium, Total Serum: 8.6 mg/dL   Protein Total, Serum: 6.5 g/dL   Albumin, Serum: 3.4 g/dL   Bilirubin Total, Serum: 0.4 mg/dL   Alkaline Phosphatase, Serum: 120 U/L   Aspartate Aminotransferase (AST/SGOT): 29 U/L   Alanine Aminotransferase (ALT/SGPT): 36 U/L DA     COVID-19 PCR: NotDetec (2021 10:32)      EKG:    As per my review shows WAP at 65/min, normal MS & QTc intervals, possible old septal infarct, normal QRS voltage, duration, and axis (+ 60), with normal transition, no ST-T abnormality.  Performed in  for PST.    -

## 2021-07-12 NOTE — CONSULT NOTE ADULT - PROBLEM SELECTOR RECOMMENDATION 2
HbA1c level was 6.4% at PST, continue on Metformin 750 mg daily, in addition to corrective insulin Lispro scale coverage before meals, resume Lisinopril 5 mg daily on POD2.

## 2021-07-12 NOTE — BRIEF OPERATIVE NOTE - NSICDXBRIEFPROCEDURE_GEN_ALL_CORE_FT
PROCEDURES:  Revision total knee arthroplasty 12-Jul-2021 18:01:56 Right , all components,  debridement Irving Rae

## 2021-07-12 NOTE — PHYSICAL THERAPY INITIAL EVALUATION ADULT - GAIT DEVIATIONS NOTED, PT EVAL
decreased janeen/decreased velocity of limb motion/decreased step length/decreased stride length/increased stride width/decreased weight-shifting ability

## 2021-07-13 ENCOUNTER — NON-APPOINTMENT (OUTPATIENT)
Age: 61
End: 2021-07-13

## 2021-07-13 ENCOUNTER — TRANSCRIPTION ENCOUNTER (OUTPATIENT)
Age: 61
End: 2021-07-13

## 2021-07-13 DIAGNOSIS — I10 ESSENTIAL (PRIMARY) HYPERTENSION: ICD-10-CM

## 2021-07-13 LAB
ANION GAP SERPL CALC-SCNC: 6 MMOL/L — SIGNIFICANT CHANGE UP (ref 5–17)
BUN SERPL-MCNC: 16 MG/DL — SIGNIFICANT CHANGE UP (ref 7–23)
CALCIUM SERPL-MCNC: 9 MG/DL — SIGNIFICANT CHANGE UP (ref 8.4–10.5)
CHLORIDE SERPL-SCNC: 103 MMOL/L — SIGNIFICANT CHANGE UP (ref 96–108)
CO2 SERPL-SCNC: 28 MMOL/L — SIGNIFICANT CHANGE UP (ref 22–31)
CREAT SERPL-MCNC: 0.82 MG/DL — SIGNIFICANT CHANGE UP (ref 0.5–1.3)
GLUCOSE BLDC GLUCOMTR-MCNC: 134 MG/DL — HIGH (ref 70–99)
GLUCOSE BLDC GLUCOMTR-MCNC: 142 MG/DL — HIGH (ref 70–99)
GLUCOSE BLDC GLUCOMTR-MCNC: 152 MG/DL — HIGH (ref 70–99)
GLUCOSE BLDC GLUCOMTR-MCNC: 152 MG/DL — HIGH (ref 70–99)
GLUCOSE SERPL-MCNC: 160 MG/DL — HIGH (ref 70–99)
GRAM STN FLD: SIGNIFICANT CHANGE UP
HCT VFR BLD CALC: 36.6 % — SIGNIFICANT CHANGE UP (ref 34.5–45)
HGB BLD-MCNC: 11.5 G/DL — SIGNIFICANT CHANGE UP (ref 11.5–15.5)
MCHC RBC-ENTMCNC: 26.7 PG — LOW (ref 27–34)
MCHC RBC-ENTMCNC: 31.4 GM/DL — LOW (ref 32–36)
MCV RBC AUTO: 85.1 FL — SIGNIFICANT CHANGE UP (ref 80–100)
NRBC # BLD: 0 /100 WBCS — SIGNIFICANT CHANGE UP (ref 0–0)
PLATELET # BLD AUTO: 290 K/UL — SIGNIFICANT CHANGE UP (ref 150–400)
POTASSIUM SERPL-MCNC: 4.8 MMOL/L — SIGNIFICANT CHANGE UP (ref 3.5–5.3)
POTASSIUM SERPL-SCNC: 4.8 MMOL/L — SIGNIFICANT CHANGE UP (ref 3.5–5.3)
RBC # BLD: 4.3 M/UL — SIGNIFICANT CHANGE UP (ref 3.8–5.2)
RBC # FLD: 13.7 % — SIGNIFICANT CHANGE UP (ref 10.3–14.5)
SODIUM SERPL-SCNC: 137 MMOL/L — SIGNIFICANT CHANGE UP (ref 135–145)
SPECIMEN SOURCE: SIGNIFICANT CHANGE UP
WBC # BLD: 16.72 K/UL — HIGH (ref 3.8–10.5)
WBC # FLD AUTO: 16.72 K/UL — HIGH (ref 3.8–10.5)

## 2021-07-13 PROCEDURE — 99232 SBSQ HOSP IP/OBS MODERATE 35: CPT

## 2021-07-13 RX ORDER — CELECOXIB 200 MG/1
1 CAPSULE ORAL
Qty: 60 | Refills: 0
Start: 2021-07-13 | End: 2021-08-11

## 2021-07-13 RX ORDER — ACETAMINOPHEN 500 MG
2 TABLET ORAL
Qty: 0 | Refills: 0 | DISCHARGE
Start: 2021-07-13

## 2021-07-13 RX ORDER — SENNA PLUS 8.6 MG/1
2 TABLET ORAL
Qty: 0 | Refills: 0 | DISCHARGE
Start: 2021-07-13

## 2021-07-13 RX ORDER — POLYETHYLENE GLYCOL 3350 17 G/17G
17 POWDER, FOR SOLUTION ORAL
Qty: 0 | Refills: 0 | DISCHARGE
Start: 2021-07-13

## 2021-07-13 RX ADMIN — HYDROMORPHONE HYDROCHLORIDE 0.5 MILLIGRAM(S): 2 INJECTION INTRAMUSCULAR; INTRAVENOUS; SUBCUTANEOUS at 03:30

## 2021-07-13 RX ADMIN — Medication 1000 MILLIGRAM(S): at 02:34

## 2021-07-13 RX ADMIN — Medication 1000 MILLIGRAM(S): at 08:45

## 2021-07-13 RX ADMIN — OXYCODONE HYDROCHLORIDE 10 MILLIGRAM(S): 5 TABLET ORAL at 15:00

## 2021-07-13 RX ADMIN — OXYCODONE HYDROCHLORIDE 10 MILLIGRAM(S): 5 TABLET ORAL at 18:00

## 2021-07-13 RX ADMIN — OXYCODONE HYDROCHLORIDE 5 MILLIGRAM(S): 5 TABLET ORAL at 00:30

## 2021-07-13 RX ADMIN — Medication 1000 MILLIGRAM(S): at 07:58

## 2021-07-13 RX ADMIN — ATORVASTATIN CALCIUM 40 MILLIGRAM(S): 80 TABLET, FILM COATED ORAL at 21:28

## 2021-07-13 RX ADMIN — CELECOXIB 100 MILLIGRAM(S): 200 CAPSULE ORAL at 21:28

## 2021-07-13 RX ADMIN — Medication 1000 MILLIGRAM(S): at 16:20

## 2021-07-13 RX ADMIN — Medication 400 MILLIGRAM(S): at 02:33

## 2021-07-13 RX ADMIN — HYDROMORPHONE HYDROCHLORIDE 0.5 MILLIGRAM(S): 2 INJECTION INTRAMUSCULAR; INTRAVENOUS; SUBCUTANEOUS at 03:05

## 2021-07-13 RX ADMIN — OXYCODONE HYDROCHLORIDE 10 MILLIGRAM(S): 5 TABLET ORAL at 15:45

## 2021-07-13 RX ADMIN — Medication 20 MILLIGRAM(S): at 11:00

## 2021-07-13 RX ADMIN — APIXABAN 2.5 MILLIGRAM(S): 2.5 TABLET, FILM COATED ORAL at 21:28

## 2021-07-13 RX ADMIN — OXYCODONE HYDROCHLORIDE 10 MILLIGRAM(S): 5 TABLET ORAL at 21:28

## 2021-07-13 RX ADMIN — Medication 1: at 12:33

## 2021-07-13 RX ADMIN — PANTOPRAZOLE SODIUM 40 MILLIGRAM(S): 20 TABLET, DELAYED RELEASE ORAL at 05:56

## 2021-07-13 RX ADMIN — Medication 1000 MILLIGRAM(S): at 17:05

## 2021-07-13 RX ADMIN — CELECOXIB 100 MILLIGRAM(S): 200 CAPSULE ORAL at 08:00

## 2021-07-13 RX ADMIN — OXYCODONE HYDROCHLORIDE 10 MILLIGRAM(S): 5 TABLET ORAL at 21:58

## 2021-07-13 RX ADMIN — CELECOXIB 100 MILLIGRAM(S): 200 CAPSULE ORAL at 08:45

## 2021-07-13 RX ADMIN — Medication 1: at 08:00

## 2021-07-13 RX ADMIN — METFORMIN HYDROCHLORIDE 750 MILLIGRAM(S): 850 TABLET ORAL at 12:33

## 2021-07-13 RX ADMIN — OXYCODONE HYDROCHLORIDE 10 MILLIGRAM(S): 5 TABLET ORAL at 09:53

## 2021-07-13 RX ADMIN — OXYCODONE HYDROCHLORIDE 10 MILLIGRAM(S): 5 TABLET ORAL at 19:00

## 2021-07-13 RX ADMIN — APIXABAN 2.5 MILLIGRAM(S): 2.5 TABLET, FILM COATED ORAL at 08:00

## 2021-07-13 RX ADMIN — CELECOXIB 100 MILLIGRAM(S): 200 CAPSULE ORAL at 21:42

## 2021-07-13 RX ADMIN — Medication 50 MILLIGRAM(S): at 16:23

## 2021-07-13 RX ADMIN — Medication 200 MILLIGRAM(S): at 05:55

## 2021-07-13 RX ADMIN — Medication 100 MILLIGRAM(S): at 08:00

## 2021-07-13 RX ADMIN — Medication 125 MICROGRAM(S): at 05:56

## 2021-07-13 RX ADMIN — OXYCODONE HYDROCHLORIDE 10 MILLIGRAM(S): 5 TABLET ORAL at 09:08

## 2021-07-13 NOTE — OCCUPATIONAL THERAPY INITIAL EVALUATION ADULT - DIAGNOSIS, OT EVAL
Pt with impaired ROM, strength, balance, sensation, pain impacting pt's ability to complete ADLs, IADLs, functional mobility/transfers.

## 2021-07-13 NOTE — PROGRESS NOTE ADULT - PROBLEM SELECTOR PLAN 1
Pain Management: acceptable- continue current care Tylenol ATC/Celebrex ATC/ Oxycodone PRN  Continue PT/OT  DVT proph: Full AC per heme - started on Eliquis  DC plan:  [ x ] Home with HC -likely Wed.

## 2021-07-13 NOTE — DISCHARGE NOTE PROVIDER - CARE PROVIDER_API CALL
Erickson Low)  Orthopedics  [address], [address]  [place] [address]  Phone: [phone]  Fax: [phone]  Established Patient  Scheduled Appointment: 07/27/2021 10:30 AM

## 2021-07-13 NOTE — DISCHARGE NOTE PROVIDER - HOSPITAL COURSE
The patient is a 61 y.o. female with hx bilat TKAs done 16 yrs ago.  She c/o persistent worsening right knee pain since a fall about 9 mo ago.  After admission on 7/13/21 and receiving pre-operative parenteral prophylactic antibiotics (ancef), the patient  underwent an  uncomplicated right TKA revision by orthopedic surgeon Dr. Erickson Low.    A medical consultation from the Hospitalist service was obtained for post-operative medical co-management. Typical Physical & occupational therapy modalities post revision TKA were performed including ambulation training, range of motion, ADL's, and transfers. Eliquis, 2.5 mg 2 times on POD 1, followed by 5 mg every 12 hrs thereafter, along w/ bilat venodynes,  was given for DVT prophylaxis.  The patient had a clean appearing surgical incision with no sign of surgical site infections and had a stable neuro / vascular exam of the operated extremity.  After progression of mobility guided by the PT/ OT staff,  the patient was felt to benefit from further rehabilitative care for restoration to level of function. This was felt to best be accomplished at home.  Discharge and Orthopedic Care instructions were delineated in the Discharge Plan and reviewed with the patient. All medications were delineated in the medication reconciliation tool and key points were reviewed with the patient. They were deemed stable from an Orthopedic & medical standpoint for discharge today.  Upon completion of Home care  they will be  following up with Dr. Low for office  follow up Orthopedic care.   The patient is a 61 y.o. female with hx bilat TKAs done 16 yrs ago.  She c/o persistent worsening right knee pain since a fall about 9 mo ago.  After admission on 7/12/21 and receiving pre-operative parenteral prophylactic antibiotics (ancef), the patient  underwent an  uncomplicated revision of the right total knee arthroplasty by orthopedic surgeon Dr. Erickson Low.    A medical consultation from the Hospitalist service was obtained for post-operative medical co-management. Typical Physical & occupational therapy modalities post revision TKA were performed including ambulation training, range of motion, ADL's, and transfers. Eliquis, 2.5 mg 2 times on POD 1, followed by 5 mg every 12 hrs thereafter, along w/ bilat venodynes,  was given for DVT prophylaxis.  The patient had a clean appearing SHAUNA negative pressure bandage over her right knee surgical incision and no sign of surgical site infections and had a stable neuro / vascular exam of the operated extremity.  After progression of mobility guided by the PT/ OT staff,  the patient was felt to benefit from further rehabilitative care for restoration to level of function. This was felt to best be accomplished at home.  Discharge and Orthopedic Care instructions were delineated in the Discharge Plan and reviewed with the patient. All medications were delineated in the medication reconciliation tool and key points were reviewed with the patient. She was deemed stable from an Orthopedic & medical standpoint for discharge today.  Upon completion of Home care she will be  following up with Dr. Finnegan his  office  for further Orthopedic care.   The patient is a 61 y.o. female with hx bilat TKAs done 16 yrs ago.  She c/o persistent worsening right knee pain since a fall about 9 mo ago.  After admission on 7/12/21 and receiving pre-operative parenteral prophylactic antibiotics (ancef), the patient  underwent an  uncomplicated revision of the right total knee arthroplasty by orthopedic surgeon Dr. Erickson Low.    A medical consultation from the Hospitalist service was obtained for post-operative medical co-management. Typical Physical & occupational therapy modalities post revision TKA were performed including ambulation training, range of motion, ADL's, and transfers. Eliquis, 2.5 mg 2 times on POD 1, followed by 5 mg every 12 hrs thereafter, along w/ bilat venodynes,  was given for DVT prophylaxis.  The patient had a clean appearing SHAUNA negative pressure bandage over her right knee surgical incision and no sign of surgical site infections and had a stable neuro / vascular exam of the operated extremity.  After progression of mobility guided by the PT/ OT staff,  the patient was felt to benefit from further rehabilitative care for restoration to level of function. This was felt to best be accomplished at home.  Discharge and Orthopedic Care instructions were delineated in the Discharge Plan and reviewed with the patient. All medications were delineated in the medication reconciliation tool and key points were reviewed with the patient. She was deemed stable from an Orthopedic & medical standpoint for discharge today.  She will be  following up with Dr. Finnegan his  office  for further Orthopedic care.

## 2021-07-13 NOTE — PHARMACOTHERAPY INTERVENTION NOTE - COMMENTS
Presurgical evaluation for postoperative medication management. Team emailed. Note placed in [place].
Transition of Care video discharge education - medication calendar given to patient
Admission medication reconciliation POD1

## 2021-07-13 NOTE — DISCHARGE NOTE PROVIDER - PROVIDER TOKENS
PROVIDER:[TOKEN:[[id number]],SCHEDULEDAPPT:[07/27/2021],SCHEDULEDAPPTTIME:[10:30 AM],ESTABLISHEDPATIENT:[T]]

## 2021-07-13 NOTE — DISCHARGE NOTE PROVIDER - NSDCFUADDAPPT_GEN_ALL_CORE_FT
It is advisable to follow up w/ your pcp in 2-3 weeks to be sure there are no underlying problems   It is advisable to follow up with your primary care provider within the next 2-3 weeks to ensure your medications are appropriate and there are no underlying problems after your procedure.

## 2021-07-13 NOTE — OCCUPATIONAL THERAPY INITIAL EVALUATION ADULT - LIVES WITH, PROFILE
Pt lives with her daughter and  in a private home, 3 steps to enter, 12 steps down to basement for laundry, with a walk in shower. Pt was independent with ADLs, IADLs, functional mobility/transfers prior to admission without AD//children/spouse Pt lives with her daughter and  in a private home, 3 steps to enter, 12 steps down to basement for laundry, with both a tub and walk in shower. Pt reports she would prefer to use the tub at home. Pt was independent with ADLs, IADLs, functional mobility/transfers prior to admission without AD//children/spouse

## 2021-07-13 NOTE — DISCHARGE NOTE PROVIDER - NSDCCPCAREPLAN_GEN_ALL_CORE_FT
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  • Take short, frequent walks increasing the distance that you walk each day as tolerated.  • Change your position every hour to decrease pain and stiffness.  • Continue the exercises taught to you by your physical therapist.  • No driving until cleared by the doctor.  • No tub baths, hot tubs, or swimming pools until instructed by your doctor.  • Do not squat down on the floor.  • Do not kneel or twist your knee.  • Range of Motion Goals: Flexion= 120 degrees, Extension = 0 degrees  Keep incision clean and dry. You have a SHAUNA dressing. You may shower. Disconnect SHAUNA battery prior to showering. Reconnect battery after showering and press orange button to resume SHAUNA power. Remove SHAUNA dressing on post-op day #7.  Keep incision clean. DO NOT APPLY ANYTHING to incision site (salves/ointments/creams). Do not scrub incision site. Pat dry after shower.  Prineo removal 2 weeks after surgery at Surgeon's office.  Prineo removal 2 weeks after surgery at Surgeon's office.         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  • Take short, frequent walks increasing the distance that you walk each day as tolerated.  • Change your position every hour to decrease pain and stiffness.  • Continue the exercises taught to you by your physical therapist.  • No driving until cleared by the doctor.  • No tub baths, hot tubs, or swimming pools until instructed by your doctor.  • Do not squat down on the floor.  • Do not kneel or twist your knee.  • Range of Motion Goals: Flexion= 120 degrees, Extension = 0 degrees  Keep incision clean and dry. You have a SHAUNA negative pressure dressing over your surgical wound. You may shower. Disconnect SHAUNA battery prior to showering. Reconnect battery after showering and press orange button to resume SHAUNA power. Remove SHAUNA dressing on post-op day #7.  Keep incision clean. DO NOT APPLY ANYTHING to incision site (salves/ointments/creams). Do not scrub incision site. Pat dry after shower.  Prineo (tape with glue) is directly over your surgical incision and this tape is  removed 2 weeks after surgery at Surgeon's office.  Please call your surgeon if you have any further questions or concerns

## 2021-07-13 NOTE — DISCHARGE NOTE PROVIDER - NSDCFUADDINST_GEN_ALL_CORE_FT
callif   Call your doctor if you experience:  • An increase in pain not controlled by pain medication or change in activity or  position.  • Temperature greater than 101° F.  • Redness, increased swelling or foul smelling drainage from or around the  incision.  • Numbness, tingling or a change in color or temperature of the operative leg.  • Call your doctor immediately if you experience chest pain, shortness of breath or calf pain.

## 2021-07-13 NOTE — DISCHARGE NOTE PROVIDER - INSTRUCTIONS
regular  For Constipation :   • Increase your water intake. Drink at least 8 glasses of water daily.  • Try adding fiber to your diet by eating fruits, vegetables and foods that are rich in grains.  • If you do experience constipation, you may take an over-the-counter stool softener/laxative such as Rosalina Colace, Senekot, miralax or  Milk of Magnesia.   Consistent carbohydrate diet. Keep excellent control of your blood sugars for better wound healing.  For Constipation :   • Increase your water intake. Drink at least 8 glasses of water daily.  • Try adding fiber to your diet by eating fruits, vegetables and foods that are rich in grains.  • If you do experience constipation, you may take an over-the-counter stool softener/laxative such as Rosalina Colace, Senekot, miralax or  Milk of Magnesia.

## 2021-07-13 NOTE — DISCHARGE NOTE PROVIDER - NSDCCPTREATMENT_GEN_ALL_CORE_FT
PRINCIPAL PROCEDURE  Procedure: Revision of all components of total knee arthroplasty  Findings and Treatment: mechanical failure right TKA w/ osteolysis

## 2021-07-13 NOTE — DISCHARGE NOTE PROVIDER - NSDCMRMEDTOKEN_GEN_ALL_CORE_FT
acetaminophen 500 mg oral tablet: 2 tab(s) orally every 8 hours  atorvastatin 40 mg oral tablet: 1 tab(s) orally once a day  FLUoxetine 20 mg oral tablet: 1 tab(s) orally once a day  levothyroxine 125 mcg (0.125 mg) oral tablet: 1 tab(s) orally once a day  lisinopril 5 mg oral tablet: 1 tab(s) orally once a day  Lyrica 50 mg oral capsule: 1 cap(s) orally 3 times a day  2 in am and 1 in pm  metFORMIN 750 mg oral tablet, extended release: 1 tab(s) orally once a day  omeprazole 40 mg oral delayed release capsule: 1 cap(s) orally once a day  polyethylene glycol 3350 oral powder for reconstitution: 17 gram(s) orally once a day (at bedtime)  senna oral tablet: 2 tab(s) orally once a day (at bedtime)  Vitamin B12 1000 mcg oral tablet: 1 tab(s) orally 3 times a week  Vitamin D3 5000 intl units (125 mcg)/mL oral liquid:    acetaminophen 500 mg oral tablet: 2 tab(s) orally every 8 hours  apixaban 5 mg oral tablet: 1 tab(s) orally every 12 hours  atorvastatin 40 mg oral tablet: 1 tab(s) orally once a day  celecoxib 100 mg oral capsule: 1 cap orally every 12 hours.  FLUoxetine 20 mg oral tablet: 1 tab(s) orally once a day  levothyroxine 125 mcg (0.125 mg) oral tablet: 1 tab(s) orally once a day  lisinopril 5 mg oral tablet: 1 tab(s) orally once a day  Lyrica 50 mg oral capsule: 1 cap(s) orally 3 times a day  2 in am and 1 in pm  metFORMIN 750 mg oral tablet, extended release: 1 tab(s) orally once a day  omeprazole 40 mg oral delayed release capsule: 1 cap(s) orally once a day  oxyCODONE 5 mg oral tablet: 1 or 2 tab(s) orally every 4 hours, As Needed -Moderate to Severe Pain (4 - 6) MDD:6    [place] [facility] ref#[id number]  polyethylene glycol 3350 oral powder for reconstitution: 17 gram(s) orally once a day (at bedtime)  senna oral tablet: 2 tab(s) orally once a day (at bedtime)  Vitamin B12 1000 mcg oral tablet: 1 tab(s) orally 3 times a week  Vitamin D3 5000 intl units (125 mcg)/mL oral liquid:    acetaminophen 500 mg oral tablet: 2 tab(s) orally every 8 hours  apixaban 5 mg oral tablet: 1 tab(s) orally every 12 hours  atorvastatin 40 mg oral tablet: 1 tab(s) orally once a day  celecoxib 100 mg oral capsule: 1 cap orally every 12 hours.  FLUoxetine 20 mg oral tablet: 1 tab(s) orally once a day  levothyroxine 125 mcg (0.125 mg) oral tablet: 1 tab(s) orally once a day  lisinopril 5 mg oral tablet: 1 tab(s) orally once a day  Lyrica 50 mg oral capsule: 1 cap(s) orally 3 times a day  2 in am and 1 in pm  metFORMIN 750 mg oral tablet, extended release: 1 tab(s) orally once a day  omeprazole 40 mg oral delayed release capsule: 1 cap(s) orally once a day  Outpatient PT: Outpatient Physical Therapy 3x/week x 4 weeks. Eval and Treat  s/p Revision Right TKR  oxyCODONE 5 mg oral tablet: 1 or 2 tab(s) orally every 4 hours, As Needed -Moderate to Severe Pain (4 - 6) MDD:6    [place] [facility] ref#[id number]  polyethylene glycol 3350 oral powder for reconstitution: 17 gram(s) orally once a day (at bedtime)  senna oral tablet: 2 tab(s) orally once a day (at bedtime)  Vitamin B12 1000 mcg oral tablet: 1 tab(s) orally 3 times a week  Vitamin D3 5000 intl units (125 mcg)/mL oral liquid:

## 2021-07-14 ENCOUNTER — TRANSCRIPTION ENCOUNTER (OUTPATIENT)
Age: 61
End: 2021-07-14

## 2021-07-14 VITALS
HEART RATE: 62 BPM | TEMPERATURE: 98 F | RESPIRATION RATE: 18 BRPM | SYSTOLIC BLOOD PRESSURE: 121 MMHG | DIASTOLIC BLOOD PRESSURE: 72 MMHG | OXYGEN SATURATION: 96 %

## 2021-07-14 LAB
ANION GAP SERPL CALC-SCNC: 4 MMOL/L — LOW (ref 5–17)
BUN SERPL-MCNC: 19 MG/DL — SIGNIFICANT CHANGE UP (ref 7–23)
CALCIUM SERPL-MCNC: 9.3 MG/DL — SIGNIFICANT CHANGE UP (ref 8.4–10.5)
CHLORIDE SERPL-SCNC: 102 MMOL/L — SIGNIFICANT CHANGE UP (ref 96–108)
CO2 SERPL-SCNC: 32 MMOL/L — HIGH (ref 22–31)
CREAT SERPL-MCNC: 0.92 MG/DL — SIGNIFICANT CHANGE UP (ref 0.5–1.3)
GLUCOSE BLDC GLUCOMTR-MCNC: 107 MG/DL — HIGH (ref 70–99)
GLUCOSE BLDC GLUCOMTR-MCNC: 136 MG/DL — HIGH (ref 70–99)
GLUCOSE SERPL-MCNC: 118 MG/DL — HIGH (ref 70–99)
HCT VFR BLD CALC: 33 % — LOW (ref 34.5–45)
HGB BLD-MCNC: 10.4 G/DL — LOW (ref 11.5–15.5)
MCHC RBC-ENTMCNC: 27.1 PG — SIGNIFICANT CHANGE UP (ref 27–34)
MCHC RBC-ENTMCNC: 31.5 GM/DL — LOW (ref 32–36)
MCV RBC AUTO: 85.9 FL — SIGNIFICANT CHANGE UP (ref 80–100)
NRBC # BLD: 0 /100 WBCS — SIGNIFICANT CHANGE UP (ref 0–0)
PLATELET # BLD AUTO: 252 K/UL — SIGNIFICANT CHANGE UP (ref 150–400)
POTASSIUM SERPL-MCNC: 4.2 MMOL/L — SIGNIFICANT CHANGE UP (ref 3.5–5.3)
POTASSIUM SERPL-SCNC: 4.2 MMOL/L — SIGNIFICANT CHANGE UP (ref 3.5–5.3)
RBC # BLD: 3.84 M/UL — SIGNIFICANT CHANGE UP (ref 3.8–5.2)
RBC # FLD: 13.9 % — SIGNIFICANT CHANGE UP (ref 10.3–14.5)
SODIUM SERPL-SCNC: 138 MMOL/L — SIGNIFICANT CHANGE UP (ref 135–145)
WBC # BLD: 11.57 K/UL — HIGH (ref 3.8–10.5)
WBC # FLD AUTO: 11.57 K/UL — HIGH (ref 3.8–10.5)

## 2021-07-14 PROCEDURE — 97165 OT EVAL LOW COMPLEX 30 MIN: CPT

## 2021-07-14 PROCEDURE — 88311 DECALCIFY TISSUE: CPT

## 2021-07-14 PROCEDURE — 87205 SMEAR GRAM STAIN: CPT

## 2021-07-14 PROCEDURE — 73560 X-RAY EXAM OF KNEE 1 OR 2: CPT

## 2021-07-14 PROCEDURE — C1889: CPT

## 2021-07-14 PROCEDURE — 82962 GLUCOSE BLOOD TEST: CPT

## 2021-07-14 PROCEDURE — 88305 TISSUE EXAM BY PATHOLOGIST: CPT

## 2021-07-14 PROCEDURE — C1776: CPT

## 2021-07-14 PROCEDURE — 88300 SURGICAL PATH GROSS: CPT

## 2021-07-14 PROCEDURE — 99232 SBSQ HOSP IP/OBS MODERATE 35: CPT

## 2021-07-14 PROCEDURE — 87070 CULTURE OTHR SPECIMN AEROBIC: CPT

## 2021-07-14 PROCEDURE — 97161 PT EVAL LOW COMPLEX 20 MIN: CPT

## 2021-07-14 PROCEDURE — 87075 CULTR BACTERIA EXCEPT BLOOD: CPT

## 2021-07-14 PROCEDURE — 97530 THERAPEUTIC ACTIVITIES: CPT

## 2021-07-14 PROCEDURE — 97535 SELF CARE MNGMENT TRAINING: CPT

## 2021-07-14 PROCEDURE — 36415 COLL VENOUS BLD VENIPUNCTURE: CPT

## 2021-07-14 PROCEDURE — 85027 COMPLETE CBC AUTOMATED: CPT

## 2021-07-14 PROCEDURE — 97116 GAIT TRAINING THERAPY: CPT

## 2021-07-14 PROCEDURE — 80048 BASIC METABOLIC PNL TOTAL CA: CPT

## 2021-07-14 PROCEDURE — C1713: CPT

## 2021-07-14 PROCEDURE — 97110 THERAPEUTIC EXERCISES: CPT

## 2021-07-14 RX ORDER — APIXABAN 2.5 MG/1
1 TABLET, FILM COATED ORAL
Qty: 1 | Refills: 0
Start: 2021-07-14 | End: 2021-07-14

## 2021-07-14 RX ORDER — OXYCODONE HYDROCHLORIDE 5 MG/1
1 TABLET ORAL
Qty: 42 | Refills: 0
Start: 2021-07-14 | End: 2021-07-20

## 2021-07-14 RX ORDER — APIXABAN 2.5 MG/1
1 TABLET, FILM COATED ORAL
Qty: 60 | Refills: 0
Start: 2021-07-14 | End: 2021-08-12

## 2021-07-14 RX ADMIN — Medication 1000 MILLIGRAM(S): at 09:49

## 2021-07-14 RX ADMIN — Medication 1000 MILLIGRAM(S): at 00:53

## 2021-07-14 RX ADMIN — CELECOXIB 100 MILLIGRAM(S): 200 CAPSULE ORAL at 09:49

## 2021-07-14 RX ADMIN — OXYCODONE HYDROCHLORIDE 10 MILLIGRAM(S): 5 TABLET ORAL at 09:49

## 2021-07-14 RX ADMIN — Medication 125 MICROGRAM(S): at 05:53

## 2021-07-14 RX ADMIN — LISINOPRIL 5 MILLIGRAM(S): 2.5 TABLET ORAL at 05:53

## 2021-07-14 RX ADMIN — OXYCODONE HYDROCHLORIDE 10 MILLIGRAM(S): 5 TABLET ORAL at 13:54

## 2021-07-14 RX ADMIN — Medication 100 MILLIGRAM(S): at 09:39

## 2021-07-14 RX ADMIN — OXYCODONE HYDROCHLORIDE 10 MILLIGRAM(S): 5 TABLET ORAL at 01:22

## 2021-07-14 RX ADMIN — CELECOXIB 100 MILLIGRAM(S): 200 CAPSULE ORAL at 09:39

## 2021-07-14 RX ADMIN — OXYCODONE HYDROCHLORIDE 10 MILLIGRAM(S): 5 TABLET ORAL at 00:52

## 2021-07-14 RX ADMIN — APIXABAN 5 MILLIGRAM(S): 2.5 TABLET, FILM COATED ORAL at 09:50

## 2021-07-14 RX ADMIN — Medication 1000 MILLIGRAM(S): at 00:52

## 2021-07-14 RX ADMIN — Medication 1000 MILLIGRAM(S): at 09:38

## 2021-07-14 RX ADMIN — OXYCODONE HYDROCHLORIDE 10 MILLIGRAM(S): 5 TABLET ORAL at 10:40

## 2021-07-14 RX ADMIN — OXYCODONE HYDROCHLORIDE 10 MILLIGRAM(S): 5 TABLET ORAL at 05:53

## 2021-07-14 RX ADMIN — Medication 20 MILLIGRAM(S): at 12:34

## 2021-07-14 RX ADMIN — PANTOPRAZOLE SODIUM 40 MILLIGRAM(S): 20 TABLET, DELAYED RELEASE ORAL at 05:53

## 2021-07-14 RX ADMIN — METFORMIN HYDROCHLORIDE 750 MILLIGRAM(S): 850 TABLET ORAL at 12:34

## 2021-07-14 NOTE — PROGRESS NOTE ADULT - PROBLEM SELECTOR PLAN 1
Pain Management: acceptable- continue current care Tylenol ATC/Celebrex ATC/ Oxycodone PRN  Continue PT/OT  DVT proph: Full AC per heme - started on Eliquis  DC plan:  [ x ] Home with  -clear for today Pain Management: acceptable- continue current care Tylenol ATC/Celebrex ATC/ Oxycodone PRN  Continue PT/OT  DVT proph: Full AC per heme - started on Eliquis  DC plan:  [ x ] Home with outpt PT -clear for today

## 2021-07-14 NOTE — DISCHARGE NOTE NURSING/CASE MANAGEMENT/SOCIAL WORK - NSDCCRNAME_GEN_ALL_CORE_FT
You said you want to decline Home care services and you were cleared by orthopedics and Physical and Occupational therapist to do outpatient Physical therapy.  RX for out patient PT given.

## 2021-07-14 NOTE — PROGRESS NOTE ADULT - ASSESSMENT
Asymptomatic post-op 62 y/o F with PMH of DM type 2, Dyslipidemia, Hypothyroidism, COVID-19 PNA 4 months ago, Chronic Venous Insufficiency s/p right leg phlebectomy in April, Fibromyalgia, GERD, OA, and Morbid Obesity. 
Presurgical evaluation:  Repeat D-dimer (?)  Topical TXA  Patient reports intolerance, not allergy, to Augmentin (vomiting). Please confirm AM of surgery. Cefazolin weight based dosing after patient weighed in preop (currently close to 120 kg cutoff).  Acetaminophen and celecoxib 100mg q12h for multimodal pain management  Patient very high risk postop VTE (Caprini 16 + currently elevated D-dimer). Per Dr. Low, revision is necessary and cannot be delayed. Hematology clearance recommends Eliquis 5mg q12h for up to at least 4 weeks (up to or at least?):  Eliquis 2.5mg q12h POD1 followed by Eliquis 5mg q12h x 4 weeks (patient should be reassessed at first postop office visit for mobility to either continue Eliquis 5mg q12h x 2 additional weeks or reduce dose to 2.5mg q12h for 2-3 weeks longer), based on risk of thrombosis versus bleeding at that time.    
Asymptomatic post-op 62 y/o F with PMH of DM type 2, Dyslipidemia, Hypothyroidism, COVID-19 PNA 4 months ago, Chronic Venous Insufficiency s/p right leg phlebectomy in April, Fibromyalgia, GERD, OA, and Morbid Obesity.

## 2021-07-14 NOTE — DISCHARGE NOTE NURSING/CASE MANAGEMENT/SOCIAL WORK - PATIENT PORTAL LINK FT
You can access the FollowMyHealth Patient Portal offered by [facility] by registering at the following website: [email or web address]. By joining [email or web address]’s FollowMyHealth portal, you will also be able to view your health information using other applications (apps) compatible with our system.

## 2021-07-14 NOTE — DISCHARGE NOTE NURSING/CASE MANAGEMENT/SOCIAL WORK - NSDCFUADDAPPT_GEN_ALL_CORE_FT
It is advisable to follow up with your primary care provider within the next 2-3 weeks to ensure your medications are appropriate and there are no underlying problems after your procedure.

## 2021-07-14 NOTE — PROGRESS NOTE ADULT - PROBLEM SELECTOR PLAN 2
continue metformin, FS check with Lispro coverage scale
continue metformin, FS check with Lispro coverage scale

## 2021-07-14 NOTE — PROGRESS NOTE ADULT - SUBJECTIVE AND OBJECTIVE BOX
Orthopedic P.A.- POD#2 - s/p Revision Right TKR    Patient alert and comfortable in bed with oral meds for pain control.  Denies knee pain or nausea. Hx of urinating frequently due to "small bladder".    Vital Signs Last 24 Hrs  T(C): 36.8 (14 Jul 2021 03:25), Max: 36.8 (13 Jul 2021 11:18)  T(F): 98.2 (14 Jul 2021 03:25), Max: 98.3 (13 Jul 2021 15:09)  HR: 61 (14 Jul 2021 05:50) (61 - 79)  BP: 133/62 (14 Jul 2021 05:50) (101/50 - 148/74)  BP(mean): --  RR: 17 (14 Jul 2021 03:25) (17 - 18)  SpO2: 95% (14 Jul 2021 03:25) (95% - 98%)         I&O's Detail    13 Jul 2021 07:01  -  14 Jul 2021 07:00  --------------------------------------------------------  IN:  Total IN: 0 mL    OUT:        Voided (mL): 600 mL  Total OUT: 1000 mL    Total NET: -1000 mL                     Labs:                                                                   10.4<L>  11.57<H> )-----------( 252      ( 14 Jul 2021 07:17 )             33.0<L>  14 Jul 2021 07:17                       *BMP in testing*                 MEDICATIONS:acetaminophen   Tablet .. 1000 milliGRAM(s) Oral every 8 hours  apixaban 5 milliGRAM(s) Oral every 12 hours  atorvastatin 40 milliGRAM(s) Oral at bedtime  bisacodyl Suppository 10 milliGRAM(s) Rectal once PRN  celecoxib 100 milliGRAM(s) Oral every 12 hours  dextrose 40% Gel 15 Gram(s) Oral once  dextrose 40% Gel 15 Gram(s) Oral once  dextrose 5%. 1000 milliLiter(s) IV Continuous <Continuous>  dextrose 5%. 1000 milliLiter(s) IV Continuous <Continuous>  dextrose 50% Injectable 25 Gram(s) IV Push once  dextrose 50% Injectable 12.5 Gram(s) IV Push once  dextrose 50% Injectable 25 Gram(s) IV Push once  dextrose 50% Injectable 25 Gram(s) IV Push once  dextrose 50% Injectable 12.5 Gram(s) IV Push once  dextrose 50% Injectable 25 Gram(s) IV Push once  FLUoxetine 20 milliGRAM(s) Oral daily  glucagon  Injectable 1 milliGRAM(s) IntraMuscular once  glucagon  Injectable 1 milliGRAM(s) IntraMuscular once  HYDROmorphone  Injectable 0.5 milliGRAM(s) IV Push every 3 hours PRN  insulin lispro (ADMELOG) corrective regimen sliding scale   SubCutaneous three times a day before meals  levothyroxine 125 MICROGram(s) Oral daily  lisinopril 5 milliGRAM(s) Oral daily  magnesium hydroxide Suspension 30 milliLiter(s) Oral daily PRN  metFORMIN 750 milliGRAM(s) Oral daily  ondansetron Injectable 4 milliGRAM(s) IV Push every 6 hours PRN  oxyCODONE    IR 5 milliGRAM(s) Oral every 3 hours PRN  oxyCODONE    IR 10 milliGRAM(s) Oral every 3 hours PRN  pantoprazole    Tablet 40 milliGRAM(s) Oral before breakfast  polyethylene glycol 3350 17 Gram(s) Oral at bedtime  pregabalin 100 milliGRAM(s) Oral daily  pregabalin 50 milliGRAM(s) Oral <User Schedule>  senna 2 Tablet(s) Oral at bedtime    Anticoagulation:  apixaban 5 milliGRAM(s) Oral every 12 hours        Pain medications:   acetaminophen   Tablet .. 1000 milliGRAM(s) Oral every 8 hours  celecoxib 100 milliGRAM(s) Oral every 12 hours  FLUoxetine 20 milliGRAM(s) Oral daily  HYDROmorphone  Injectable 0.5 milliGRAM(s) IV Push every 3 hours PRN  ondansetron Injectable 4 milliGRAM(s) IV Push every 6 hours PRN  oxyCODONE    IR 5 milliGRAM(s) Oral every 3 hours PRN  oxyCODONE    IR 10 milliGRAM(s) Oral every 3 hours PRN  pregabalin 100 milliGRAM(s) Oral daily  pregabalin 50 milliGRAM(s) Oral <User Schedule>                                      Physical Exam:  Right knee- Primary surgical bandage/SHAUNA dry and intact.   Neurovascular grossly intact LE's.  PAS on LE's.  Calves soft and non-tender.                                                                                                                                                          A/P:  Orthopedically stable.  -Continue pain management with above plan  -DVT prophylaxis with eliquis 5mg po every 12 hours as per patient's private MD.  -Check labs again today  -Increase ambulation and ROM right knee with PT/OT  -Dr. Washington for continued medical care  -Discharge planning for Home with home care services being negotiated with [facility]  and [employer] (patient's insurance)  -Further plan as per attendings.               
Discharge medication calendar:  Eliquis 5mg q12h x 4 weeks (heme recommendation)  APAP 1000mg q8h x 2-3 weeks  Celecoxib 100mg q12h x 2-3 weeks  Omeprazole 40mg QAM (home med)  Narcotic PRN  Docusate 100mg TID while taking narcotic  Miralax, Senna, or Bisacodyl PRN for treatment of constipation  
MANJEET YEBOAH  [id number]    Pt is a 61y year old Female s/p right TKR revision. Pain is mild to moderate at rest. Tolerating PO food and fluids.   Denies chest pain/shortness of breath/nausea/vomitting.  Strongly requests maintaining sousa citing her longstanding history of urinary incontinence and frequency. Also requests regular diet stating that she is not a Type 2 diabetic and that she has been on metformin for hormonal issues for more than 20 years.    Vital Signs Last 24 Hrs  T(C): 36.7 (13 Jul 2021 03:34), Max: 36.7 (12 Jul 2021 21:00)  T(F): 98 (13 Jul 2021 03:34), Max: 98 (12 Jul 2021 21:00)  HR: 65 (13 Jul 2021 03:34) (62 - 89)  BP: 109/67 (13 Jul 2021 03:34) (109/67 - 180/72)  BP(mean): --  RR: 18 (13 Jul 2021 03:34) (14 - 24)  SpO2: 99% (13 Jul 2021 03:34) (95% - 100%)      07-12 @ 07:01  -  07-13 @ 07:00  --------------------------------------------------------  IN: 2900 mL / OUT: 2200 mL / NET: 700 mL                              11.5   16.72 )-----------( 290      ( 13 Jul 2021 06:51 )             36.6               PE:   RLE: Primary dressing CDI, SILT distally, (+2) DP pulses, EHL/FHL/TA intact.  Negative calf tenderness. PAS on. Dressing changed to lighter wrap ace padding over wound.      A: 61y year old Female s/p right TKR revision POD#1    Plan:   -DVT ppx = Eliquis 2.5 mg twice today then 5 mg bid due to very high risk for clotting.    -PT/OT = OOB, TKR protocols WBAT, no brace needed.  -Pain control adequate at his time.  Nerve block likely still effective.  Patient was told to request medication as pain increases and block wears off.  -Medicine to follow - Patient denies diabetes diagnosis, and is on metformin for hormonal issues.  At this pint I will order a regular diwt at her request.  -Maintain sousa until patient is more mobile to reduce risk of contaminating dressing and wound.  -continue to follow Labs  -Incentive spirometry, continue use of PAS  -Dispo: Home when cleared by PT and medicine.
Presurgical evaluation:    NKDA  Intolerance:  Augmentin: Vomiting    Home Medications:   · 	atorvastatin 40 mg once a day  · 	FLUoxetine 20 mg once a day  · 	levothyroxine 125 mcg once a day  · 	lisinopril 5 mg once a day  · 	metFORMIN 750 mg ER once a day  · 	omeprazole 40 mg once a day  · 	Pregabalin (Lyrica) 50 mg -- 2 in am and 1 in pm  · 	Vitamin B12 1000 mcg 3 times a week  · 	Vitamin D3 5000 intl units once a day    PAST MEDICAL HISTORY:  Acid reflux   History of fibromyalgia   History of varicose veins   Hypothyroid   Osteoarthritis   Pneumonia due to COVID-19 virus 3/2021  inpatient at [facility]  Severe obesity (BMI >= 40)   Type 2 diabetes mellitus   Uterine hemorrhage   Venous insufficiency of leg     PAST SURGICAL HISTORY:     hysterectomy   myringotomy   S/P FESS (functional endoscopic sinus surgery) 2007  Status post bilateral knee replacements 2005  Varicose vein of leg right leg, excised 2021 thigh to knee    PST values of interest:  D-dimer 359 (­) – due to recent varicose vein stripping versus recent COVID-19 pneumonia requiring hospitalization and treatment?  HbA1c: 6.4%  SCr 0.83  LFTs WNL  QTc 444ms (WNL)    
Patient is a 61y old  Female who presents with a chief complaint of right knee pain, s/p right TKA and fall-- for  revision right TKA (13 Jul 2021 09:51)    INTERVAL HPI/OVERNIGHT EVENTS:  So far pain controlled, patient concerned about pain control at home.     MEDICATIONS  (STANDING):  acetaminophen   Tablet .. 1000 milliGRAM(s) Oral every 8 hours  apixaban 2.5 milliGRAM(s) Oral every 12 hours  atorvastatin 40 milliGRAM(s) Oral at bedtime  celecoxib 100 milliGRAM(s) Oral every 12 hours  FLUoxetine 20 milliGRAM(s) Oral daily  glucagon  Injectable 1 milliGRAM(s) IntraMuscular once  glucagon  Injectable 1 milliGRAM(s) IntraMuscular once  insulin lispro (ADMELOG) corrective regimen sliding scale   SubCutaneous three times a day before meals  lactated ringers. 1000 milliLiter(s) (100 mL/Hr) IV Continuous <Continuous>  levothyroxine 125 MICROGram(s) Oral daily  metFORMIN 750 milliGRAM(s) Oral daily  pantoprazole    Tablet 40 milliGRAM(s) Oral before breakfast  polyethylene glycol 3350 17 Gram(s) Oral at bedtime  pregabalin 100 milliGRAM(s) Oral daily  pregabalin 50 milliGRAM(s) Oral <User Schedule>  senna 2 Tablet(s) Oral at bedtime    MEDICATIONS  (PRN):  HYDROmorphone  Injectable 0.5 milliGRAM(s) IV Push every 3 hours PRN breakthrough pain  magnesium hydroxide Suspension 30 milliLiter(s) Oral daily PRN Constipation  ondansetron Injectable 4 milliGRAM(s) IV Push every 6 hours PRN Nausea and/or Vomiting  oxyCODONE    IR 5 milliGRAM(s) Oral every 3 hours PRN Moderate Pain (4 - 6)  oxyCODONE    IR 10 milliGRAM(s) Oral every 3 hours PRN Severe Pain (7 - 10)    Allergies  No Known Allergies    Intolerances  Augmentin (Vomiting)    REVIEW OF SYSTEMS:  CONSTITUTIONAL: No fever, weight loss, or fatigue  EYES: No eye pain, visual disturbances, or discharge  ENMT:  No difficulty hearing, tinnitus, vertigo; No sinus or throat pain  NECK: No pain or stiffness  BREASTS: No pain, masses, or nipple discharge  RESPIRATORY: No cough, wheezing, chills or hemoptysis; No shortness of breath  CARDIOVASCULAR: No chest pain, palpitations, or lightheadedness  GASTROINTESTINAL: No abdominal or epigastric pain. No nausea, vomiting, or hematemesis; No diarrhea or constipation. No melena or hematochezia.  GENITOURINARY: No dysuria, frequency, hematuria, or incontinence  NEUROLOGICAL: No headaches, vertigo, memory loss, loss of strength, numbness, or tremors  SKIN: No itching, burning, rashes, or lesions   LYMPH NODES: No enlarged glands  ENDOCRINE: No heat or cold intolerance; No hair loss; No polydipsia or polyuria  MUSCULOSKELETAL: No back pain  PSYCHIATRIC: No depression, anxiety, or mood swings  HEME/LYMPH: No easy bruising, or bleeding gums  ALLERGY AND IMMUNOLOGIC: No hives or eczema    Vital Signs Last 24 Hrs  T(C): 36.8 (13 Jul 2021 11:18), Max: 36.8 (13 Jul 2021 11:18)  T(F): 98.2 (13 Jul 2021 11:18), Max: 98.2 (13 Jul 2021 11:18)  HR: 65 (13 Jul 2021 11:18) (64 - 89)  BP: 148/74 (13 Jul 2021 11:18) (109/67 - 180/72)  BP(mean): --  RR: 18 (13 Jul 2021 11:18) (14 - 24)  SpO2: 95% (13 Jul 2021 11:18) (95% - 100%)    PHYSICAL EXAM:  GENERAL: NAD, well-groomed, well-developed  HEAD:  Atraumatic, Normocephalic  EYES: conjunctiva and sclera clear  ENMT: Moist mucous membranes  NECK: Supple, No JVD  NERVOUS SYSTEM:  Alert & Oriented X3, Good concentration; Bilateral LE mobile, sensation to light touch intact  CHEST/LUNG: Clear to auscultation bilaterally; No rales, rhonchi, wheezing, or rubs  HEART: Regular rate and rhythm; No murmurs, rubs, or gallops  ABDOMEN: Soft, Nontender, Nondistended; Bowel sounds present  EXTREMITIES:  2+ Peripheral Pulses, No clubbing or cyanosis  LYMPH: No lymphadenopathy noted  SKIN: No rashes or lesions  INCISION:  Dressing dry and intact    LABS:                        11.5   16.72 )-----------( 290      ( 13 Jul 2021 06:51 )             36.6     13 Jul 2021 06:51    137    |  103    |  16     ----------------------------<  160    4.8     |  28     |  0.82     Ca    9.0        13 Jul 2021 06:51      CAPILLARY BLOOD GLUCOSE  POCT Blood Glucose.: 152 mg/dL (13 Jul 2021 12:09)  POCT Blood Glucose.: 152 mg/dL (13 Jul 2021 07:51)  POCT Blood Glucose.: 152 mg/dL (12 Jul 2021 20:29)      RADIOLOGY & ADDITIONAL TESTS:    Imaging Personally Reviewed:      [ ] Consultant(s) Notes Reviewed  [x] Care Discussed with Consultants/Other Providers:  Ortho PA- plan of care
Patient is a 61y old  Female who presents with a chief complaint of right knee pain, s/p right TKA and fall-- for  revision right TKA (13 Jul 2021 09:51)    INTERVAL HPI/OVERNIGHT EVENTS: feeling well today.  feels comfortable going home. pain controlled.     MEDICATIONS  (STANDING):  acetaminophen   Tablet .. 1000 milliGRAM(s) Oral every 8 hours  apixaban 5 milliGRAM(s) Oral every 12 hours  atorvastatin 40 milliGRAM(s) Oral at bedtime  celecoxib 100 milliGRAM(s) Oral every 12 hours  FLUoxetine 20 milliGRAM(s) Oral daily  insulin lispro (ADMELOG) corrective regimen sliding scale   SubCutaneous three times a day before meals  levothyroxine 125 MICROGram(s) Oral daily  lisinopril 5 milliGRAM(s) Oral daily  metFORMIN 750 milliGRAM(s) Oral daily  pantoprazole    Tablet 40 milliGRAM(s) Oral before breakfast  polyethylene glycol 3350 17 Gram(s) Oral at bedtime  pregabalin 100 milliGRAM(s) Oral daily  pregabalin 50 milliGRAM(s) Oral <User Schedule>  senna 2 Tablet(s) Oral at bedtime    MEDICATIONS  (PRN):  bisacodyl Suppository 10 milliGRAM(s) Rectal once PRN Constipation  HYDROmorphone  Injectable 0.5 milliGRAM(s) IV Push every 3 hours PRN breakthrough pain  magnesium hydroxide Suspension 30 milliLiter(s) Oral daily PRN Constipation  ondansetron Injectable 4 milliGRAM(s) IV Push every 6 hours PRN Nausea and/or Vomiting  oxyCODONE    IR 5 milliGRAM(s) Oral every 3 hours PRN Moderate Pain (4 - 6)  oxyCODONE    IR 10 milliGRAM(s) Oral every 3 hours PRN Severe Pain (7 - 10)    Allergies  No Known Allergies    Intolerances  Augmentin (Vomiting)      REVIEW OF SYSTEMS:  CONSTITUTIONAL: No fever, weight loss, or fatigue  EYES: No eye pain, visual disturbances, or discharge  ENMT:  No difficulty hearing, tinnitus, vertigo; No sinus or throat pain  NECK: No pain or stiffness  BREASTS: No pain, masses, or nipple discharge  RESPIRATORY: No cough, wheezing, chills or hemoptysis; No shortness of breath  CARDIOVASCULAR: No chest pain, palpitations, or lightheadedness  GASTROINTESTINAL: No abdominal or epigastric pain. No nausea, vomiting, or hematemesis; No diarrhea or constipation. No melena or hematochezia.  GENITOURINARY: No dysuria, frequency, hematuria, or incontinence  NEUROLOGICAL: No headaches, vertigo, memory loss, loss of strength, numbness, or tremors  SKIN: No itching, burning, rashes, or lesions   LYMPH NODES: No enlarged glands  ENDOCRINE: No heat or cold intolerance; No hair loss; No polydipsia or polyuria  MUSCULOSKELETAL: No back pain  PSYCHIATRIC: No depression, anxiety, or mood swings  HEME/LYMPH: No easy bruising, or bleeding gums  ALLERGY AND IMMUNOLOGIC: No hives or eczema    Vital Signs Last 24 Hrs  T(C): 36.7 (14 Jul 2021 11:51), Max: 36.8 (13 Jul 2021 15:09)  T(F): 98 (14 Jul 2021 11:51), Max: 98.3 (13 Jul 2021 15:09)  HR: 62 (14 Jul 2021 11:51) (61 - 74)  BP: 121/72 (14 Jul 2021 11:51) (101/50 - 153/70)  BP(mean): --  RR: 18 (14 Jul 2021 11:51) (17 - 18)  SpO2: 96% (14 Jul 2021 11:51) (95% - 98%)    PHYSICAL EXAM:  GENERAL: NAD, well-groomed, well-developed  HEAD:  Atraumatic, Normocephalic  EYES:  conjunctiva and sclera clear  ENMT: Moist mucous membranes  NECK: Supple, No JVD  NERVOUS SYSTEM:  Alert & Oriented X3, Good concentration; Bilateral LE mobile, sensation to light touch intact  CHEST/LUNG: Clear to auscultation bilaterally; No rales, rhonchi, wheezing, or rubs  HEART: Regular rate and rhythm; No murmurs, rubs, or gallops  ABDOMEN: Soft, Nontender, Nondistended; Bowel sounds present  EXTREMITIES:  2+ Peripheral Pulses, No clubbing or cyanosis  LYMPH: No lymphadenopathy noted  SKIN: No rashes or lesions  INCISION:  Dressing dry and intact    LABS:                        10.4   11.57 )-----------( 252      ( 14 Jul 2021 07:17 )             33.0     14 Jul 2021 07:17    138    |  102    |  19     ----------------------------<  118    4.2     |  32     |  0.92     Ca    9.3        14 Jul 2021 07:17        CAPILLARY BLOOD GLUCOSE  POCT Blood Glucose.: 136 mg/dL (14 Jul 2021 12:06)  POCT Blood Glucose.: 107 mg/dL (14 Jul 2021 07:34)  POCT Blood Glucose.: 142 mg/dL (13 Jul 2021 21:26)  POCT Blood Glucose.: 134 mg/dL (13 Jul 2021 16:29)      RADIOLOGY & ADDITIONAL TESTS:    Imaging Personally Reviewed:      [ ] Consultant(s) Notes Reviewed  [x] Care Discussed with Consultants/Other Providers:  Ortho PA- plan of care

## 2021-07-18 ENCOUNTER — EMERGENCY (EMERGENCY)
Facility: HOSPITAL | Age: 61
LOS: 1 days | Discharge: HOME | End: 2021-07-18
Attending: EMERGENCY MEDICINE | Admitting: EMERGENCY MEDICINE
Payer: COMMERCIAL

## 2021-07-18 VITALS
RESPIRATION RATE: 18 BRPM | DIASTOLIC BLOOD PRESSURE: 71 MMHG | TEMPERATURE: 98 F | OXYGEN SATURATION: 95 % | HEART RATE: 71 BPM | SYSTOLIC BLOOD PRESSURE: 159 MMHG

## 2021-07-18 VITALS
SYSTOLIC BLOOD PRESSURE: 179 MMHG | WEIGHT: 259.93 LBS | RESPIRATION RATE: 18 BRPM | HEIGHT: 63 IN | OXYGEN SATURATION: 97 % | HEART RATE: 72 BPM | TEMPERATURE: 98 F | DIASTOLIC BLOOD PRESSURE: 72 MMHG

## 2021-07-18 DIAGNOSIS — Z98.890 OTHER SPECIFIED POSTPROCEDURAL STATES: Chronic | ICD-10-CM

## 2021-07-18 DIAGNOSIS — I83.90 ASYMPTOMATIC VARICOSE VEINS OF UNSPECIFIED LOWER EXTREMITY: Chronic | ICD-10-CM

## 2021-07-18 DIAGNOSIS — Z98.891 HISTORY OF UTERINE SCAR FROM PREVIOUS SURGERY: Chronic | ICD-10-CM

## 2021-07-18 DIAGNOSIS — Z96.653 PRESENCE OF ARTIFICIAL KNEE JOINT, BILATERAL: Chronic | ICD-10-CM

## 2021-07-18 DIAGNOSIS — Z90.710 ACQUIRED ABSENCE OF BOTH CERVIX AND UTERUS: Chronic | ICD-10-CM

## 2021-07-18 LAB
ALBUMIN SERPL ELPH-MCNC: 3 G/DL — LOW (ref 3.3–5)
ALP SERPL-CCNC: 113 U/L — SIGNIFICANT CHANGE UP (ref 30–120)
ALT FLD-CCNC: 29 U/L DA — SIGNIFICANT CHANGE UP (ref 10–60)
ANION GAP SERPL CALC-SCNC: 6 MMOL/L — SIGNIFICANT CHANGE UP (ref 5–17)
APPEARANCE UR: CLEAR — SIGNIFICANT CHANGE UP
APTT BLD: 33.4 SEC — SIGNIFICANT CHANGE UP (ref 27.5–35.5)
AST SERPL-CCNC: 26 U/L — SIGNIFICANT CHANGE UP (ref 10–40)
BACTERIA # UR AUTO: ABNORMAL
BASOPHILS # BLD AUTO: 0.08 K/UL — SIGNIFICANT CHANGE UP (ref 0–0.2)
BASOPHILS NFR BLD AUTO: 0.6 % — SIGNIFICANT CHANGE UP (ref 0–2)
BILIRUB SERPL-MCNC: 0.4 MG/DL — SIGNIFICANT CHANGE UP (ref 0.2–1.2)
BILIRUB UR-MCNC: NEGATIVE — SIGNIFICANT CHANGE UP
BUN SERPL-MCNC: 19 MG/DL — SIGNIFICANT CHANGE UP (ref 7–23)
CALCIUM SERPL-MCNC: 9.3 MG/DL — SIGNIFICANT CHANGE UP (ref 8.4–10.5)
CHLORIDE SERPL-SCNC: 102 MMOL/L — SIGNIFICANT CHANGE UP (ref 96–108)
CO2 SERPL-SCNC: 29 MMOL/L — SIGNIFICANT CHANGE UP (ref 22–31)
COLOR SPEC: YELLOW — SIGNIFICANT CHANGE UP
CREAT SERPL-MCNC: 0.9 MG/DL — SIGNIFICANT CHANGE UP (ref 0.5–1.3)
DIFF PNL FLD: NEGATIVE — SIGNIFICANT CHANGE UP
EOSINOPHIL # BLD AUTO: 0.33 K/UL — SIGNIFICANT CHANGE UP (ref 0–0.5)
EOSINOPHIL NFR BLD AUTO: 2.4 % — SIGNIFICANT CHANGE UP (ref 0–6)
EPI CELLS # UR: SIGNIFICANT CHANGE UP
GLUCOSE SERPL-MCNC: 118 MG/DL — HIGH (ref 70–99)
GLUCOSE UR QL: NEGATIVE MG/DL — SIGNIFICANT CHANGE UP
HCT VFR BLD CALC: 31 % — LOW (ref 34.5–45)
HGB BLD-MCNC: 10 G/DL — LOW (ref 11.5–15.5)
HYALINE CASTS # UR AUTO: ABNORMAL
IMM GRANULOCYTES NFR BLD AUTO: 1.7 % — HIGH (ref 0–1.5)
INR BLD: 1.15 RATIO — SIGNIFICANT CHANGE UP (ref 0.88–1.16)
KETONES UR-MCNC: NEGATIVE — SIGNIFICANT CHANGE UP
LACTATE SERPL-SCNC: 1.3 MMOL/L — SIGNIFICANT CHANGE UP (ref 0.7–2)
LEUKOCYTE ESTERASE UR-ACNC: ABNORMAL
LYMPHOCYTES # BLD AUTO: 2.8 K/UL — SIGNIFICANT CHANGE UP (ref 1–3.3)
LYMPHOCYTES # BLD AUTO: 20.3 % — SIGNIFICANT CHANGE UP (ref 13–44)
MCHC RBC-ENTMCNC: 27.7 PG — SIGNIFICANT CHANGE UP (ref 27–34)
MCHC RBC-ENTMCNC: 32.3 GM/DL — SIGNIFICANT CHANGE UP (ref 32–36)
MCV RBC AUTO: 85.9 FL — SIGNIFICANT CHANGE UP (ref 80–100)
MONOCYTES # BLD AUTO: 1.07 K/UL — HIGH (ref 0–0.9)
MONOCYTES NFR BLD AUTO: 7.8 % — SIGNIFICANT CHANGE UP (ref 2–14)
NEUTROPHILS # BLD AUTO: 9.24 K/UL — HIGH (ref 1.8–7.4)
NEUTROPHILS NFR BLD AUTO: 67.2 % — SIGNIFICANT CHANGE UP (ref 43–77)
NITRITE UR-MCNC: NEGATIVE — SIGNIFICANT CHANGE UP
NRBC # BLD: 0 /100 WBCS — SIGNIFICANT CHANGE UP (ref 0–0)
PH UR: 5 — SIGNIFICANT CHANGE UP (ref 5–8)
PLATELET # BLD AUTO: 288 K/UL — SIGNIFICANT CHANGE UP (ref 150–400)
POTASSIUM SERPL-MCNC: 4.4 MMOL/L — SIGNIFICANT CHANGE UP (ref 3.5–5.3)
POTASSIUM SERPL-SCNC: 4.4 MMOL/L — SIGNIFICANT CHANGE UP (ref 3.5–5.3)
PROT SERPL-MCNC: 6.1 G/DL — SIGNIFICANT CHANGE UP (ref 6–8.3)
PROT UR-MCNC: NEGATIVE MG/DL — SIGNIFICANT CHANGE UP
PROTHROM AB SERPL-ACNC: 13.8 SEC — HIGH (ref 10.6–13.6)
RBC # BLD: 3.61 M/UL — LOW (ref 3.8–5.2)
RBC # FLD: 13.9 % — SIGNIFICANT CHANGE UP (ref 10.3–14.5)
RBC CASTS # UR COMP ASSIST: ABNORMAL /HPF (ref 0–4)
SODIUM SERPL-SCNC: 137 MMOL/L — SIGNIFICANT CHANGE UP (ref 135–145)
SP GR SPEC: 1.01 — SIGNIFICANT CHANGE UP (ref 1.01–1.02)
UROBILINOGEN FLD QL: NEGATIVE MG/DL — SIGNIFICANT CHANGE UP
WBC # BLD: 13.76 K/UL — HIGH (ref 3.8–10.5)
WBC # FLD AUTO: 13.76 K/UL — HIGH (ref 3.8–10.5)
WBC UR QL: ABNORMAL

## 2021-07-18 PROCEDURE — 85730 THROMBOPLASTIN TIME PARTIAL: CPT

## 2021-07-18 PROCEDURE — 83605 ASSAY OF LACTIC ACID: CPT

## 2021-07-18 PROCEDURE — 93971 EXTREMITY STUDY: CPT

## 2021-07-18 PROCEDURE — 96366 THER/PROPH/DIAG IV INF ADDON: CPT

## 2021-07-18 PROCEDURE — 96365 THER/PROPH/DIAG IV INF INIT: CPT

## 2021-07-18 PROCEDURE — 85610 PROTHROMBIN TIME: CPT

## 2021-07-18 PROCEDURE — 99053 MED SERV 10PM-8AM 24 HR FAC: CPT

## 2021-07-18 PROCEDURE — 36415 COLL VENOUS BLD VENIPUNCTURE: CPT

## 2021-07-18 PROCEDURE — 99284 EMERGENCY DEPT VISIT MOD MDM: CPT | Mod: 25

## 2021-07-18 PROCEDURE — 96367 TX/PROPH/DG ADDL SEQ IV INF: CPT

## 2021-07-18 PROCEDURE — 93971 EXTREMITY STUDY: CPT | Mod: 26,RT

## 2021-07-18 PROCEDURE — 85025 COMPLETE CBC W/AUTO DIFF WBC: CPT

## 2021-07-18 PROCEDURE — 80053 COMPREHEN METABOLIC PANEL: CPT

## 2021-07-18 PROCEDURE — 81001 URINALYSIS AUTO W/SCOPE: CPT

## 2021-07-18 PROCEDURE — 87086 URINE CULTURE/COLONY COUNT: CPT

## 2021-07-18 PROCEDURE — 99284 EMERGENCY DEPT VISIT MOD MDM: CPT

## 2021-07-18 PROCEDURE — 87040 BLOOD CULTURE FOR BACTERIA: CPT

## 2021-07-18 PROCEDURE — 93010 ELECTROCARDIOGRAM REPORT: CPT

## 2021-07-18 PROCEDURE — 93005 ELECTROCARDIOGRAM TRACING: CPT

## 2021-07-18 RX ORDER — VANCOMYCIN HCL 1 G
1750 VIAL (EA) INTRAVENOUS ONCE
Refills: 0 | Status: COMPLETED | OUTPATIENT
Start: 2021-07-18 | End: 2021-07-18

## 2021-07-18 RX ORDER — PIPERACILLIN AND TAZOBACTAM 4; .5 G/20ML; G/20ML
3.38 INJECTION, POWDER, LYOPHILIZED, FOR SOLUTION INTRAVENOUS ONCE
Refills: 0 | Status: COMPLETED | OUTPATIENT
Start: 2021-07-18 | End: 2021-07-18

## 2021-07-18 RX ORDER — PREGABALIN 225 MG/1
1 CAPSULE ORAL
Qty: 0 | Refills: 0 | DISCHARGE

## 2021-07-18 RX ORDER — CEPHALEXIN 500 MG
1 CAPSULE ORAL
Qty: 28 | Refills: 0
Start: 2021-07-18 | End: 2021-07-24

## 2021-07-18 RX ORDER — SODIUM CHLORIDE 9 MG/ML
1600 INJECTION INTRAMUSCULAR; INTRAVENOUS; SUBCUTANEOUS ONCE
Refills: 0 | Status: COMPLETED | OUTPATIENT
Start: 2021-07-18 | End: 2021-07-18

## 2021-07-18 RX ORDER — FLUOXETINE HCL 10 MG
1 CAPSULE ORAL
Qty: 0 | Refills: 0 | DISCHARGE

## 2021-07-18 RX ORDER — CHOLECALCIFEROL (VITAMIN D3) 125 MCG
0 CAPSULE ORAL
Qty: 0 | Refills: 0 | DISCHARGE

## 2021-07-18 RX ADMIN — Medication 250 MILLIGRAM(S): at 02:07

## 2021-07-18 RX ADMIN — SODIUM CHLORIDE 1600 MILLILITER(S): 9 INJECTION INTRAMUSCULAR; INTRAVENOUS; SUBCUTANEOUS at 02:30

## 2021-07-18 RX ADMIN — SODIUM CHLORIDE 1600 MILLILITER(S): 9 INJECTION INTRAMUSCULAR; INTRAVENOUS; SUBCUTANEOUS at 01:35

## 2021-07-18 RX ADMIN — PIPERACILLIN AND TAZOBACTAM 200 GRAM(S): 4; .5 INJECTION, POWDER, LYOPHILIZED, FOR SOLUTION INTRAVENOUS at 01:35

## 2021-07-18 RX ADMIN — Medication 1750 MILLIGRAM(S): at 04:10

## 2021-07-18 RX ADMIN — PIPERACILLIN AND TAZOBACTAM 3.38 GRAM(S): 4; .5 INJECTION, POWDER, LYOPHILIZED, FOR SOLUTION INTRAVENOUS at 01:59

## 2021-07-18 NOTE — ED ADULT NURSE NOTE - PMH
Acid reflux    History of fibromyalgia    History of varicose veins    Hypothyroid    Osteoarthritis    Pneumonia due to COVID-19 virus  3/2021  inpatient at [facility]  Severe obesity (BMI >= 40)    Type 2 diabetes mellitus    Uterine hemorrhage    Venous insufficiency of leg

## 2021-07-18 NOTE — ED ADULT TRIAGE NOTE - CHIEF COMPLAINT QUOTE
hx Right TKR on Monday. presents c/o right ankle redness, swelling, hot since Thursday. Oxycodone po w/out relief.

## 2021-07-18 NOTE — ED PROVIDER NOTE - CARE PROVIDER_API CALL
Erickson Low)  Orthopedics  [address], [address]  [place] [address]  Phone: [phone]  Fax: [phone]  Follow Up Time: 1-3 Days

## 2021-07-18 NOTE — ED PROVIDER NOTE - CLINICAL SUMMARY MEDICAL DECISION MAKING FREE TEXT BOX
Patient with evidence of cellulitis to right leg post TKR. Will get doppler of leg to r/o DVT, check labs, and give IV antibiotics. If no significant findings on labs/doppler, will treat po after initial IV dose. Patient understands need for close observation and follow up

## 2021-07-18 NOTE — ED PROVIDER NOTE - PMH
Acid reflux    History of fibromyalgia    History of varicose veins    Hypothyroid    Osteoarthritis    Pneumonia due to COVID-19 virus  3/2021  inpatient at [facility]  Severe obesity (BMI >= 40)    Type 2 diabetes mellitus    Uterine hemorrhage    Venous insufficiency of leg

## 2021-07-18 NOTE — ED PROVIDER NOTE - NSFOLLOWUPINSTRUCTIONS_ED_ALL_ED_FT
Cellulitis    WHAT YOU NEED TO KNOW:    What is cellulitis? Cellulitis is a skin infection caused by bacteria. Cellulitis is common and can become severe. Cellulitis usually appears on the lower legs. It can also appear on the arms, face, and other areas. Cellulitis develops when bacteria enter a crack or break in your skin, such as a scratch, bite, or cut.    Cellulitis          What are the signs and symptoms of cellulitis? Signs and symptoms usually appear on one side of your body. You may have any of the following:  •A fever      •A red, warm, swollen area on your skin      •Pain when the area is touched      •Red spots, bumps, or blisters that may drain pus      •Bumpy, raised skin that feels like an orange peel      How is cellulitis diagnosed? Your healthcare provider may know you have cellulitis by looking at your skin. You may need blood tests to show what kind of bacteria are causing your infection. Other tests may be needed to see how much the infection has spread.    How is cellulitis treated? You should start to see improvement in 3 days. If your cellulitis is severe, you may need IV antibiotics in the hospital. If cellulitis is not treated, the infection can spread through your body and become life-threatening. You may need any of the following medicines:  •Antibiotics help treat the bacterial infection.      •Acetaminophen decreases pain and fever. It is available without a doctor's order. Ask how much to take and how often to take it. Follow directions. Read the labels of all other medicines you are using to see if they also contain acetaminophen, or ask your doctor or pharmacist. Acetaminophen can cause liver damage if not taken correctly. Do not use more than 4 grams (4,000 milligrams) total of acetaminophen in one day.       •NSAIDs, such as ibuprofen, help decrease swelling, pain, and fever. This medicine is available with or without a doctor's order. NSAIDs can cause stomach bleeding or kidney problems in certain people. If you take blood thinner medicine, always ask your healthcare provider if NSAIDs are safe for you. Always read the medicine label and follow directions.      How can I manage my symptoms?   •Wash the area with soap and water every day. Gently pat dry. Use bandages if directed by your healthcare provider.      •Elevate the area above the level of your heart as often as you can. This will help decrease swelling and pain. Prop the area on pillows or blankets to keep it elevated comfortably.  Elevate Leg           •Place a cool, damp cloth on the area. Use clean cloths and clean water. You can do this as often as you need to. Cool, damp cloths may help decrease pain.      •Apply cream or ointment as directed. These help protect the area. Most over-the-counter products, such as petroleum jelly, are good to use. Ask your healthcare provider about specific creams or ointments you should use.      How can I help prevent cellulitis?   •Do not scratch bug bites or areas of injury. You increase your risk for cellulitis by scratching these areas.      •Do not share personal items, such as towels, clothing, and razors.      •Clean exercise equipment with germ-killing  before and after you use it.      •Treat athlete’s foot. This can help prevent the spread of a bacterial skin infection.      •Wash your hands often. Use soap and water. Wash your hands after you use the bathroom, change a child's diapers, or sneeze. Wash your hands before you prepare or eat food. Use lotion to prevent dry, cracked skin.  Handwashing           When should I seek immediate care?   •Your wound gets larger and more painful.      •You feel a crackling under your skin when you touch it.      •You have purple dots or bumps on your skin, or you see bleeding under your skin.      •You see red streaks coming from the infected area.      When should I call my doctor?   •The red, warm, swollen area gets larger.      •Your fever or pain does not go away or gets worse.      •The area does not get smaller after 3 days of antibiotics.      •You have questions or concerns about your condition or care.      CARE AGREEMENT:    You have the right to help plan your care. Learn about your health condition and how it may be treated. Discuss treatment options with your healthcare providers to decide what care you want to receive. You always have the right to refuse treatment.

## 2021-07-18 NOTE — ED ADULT NURSE NOTE - PSH
H/O:     H/O: hysterectomy    History of myringotomy  2006  S/P FESS (functional endoscopic sinus surgery)  2007  Status post bilateral knee replacements  2005  Varicose vein of leg  right leg, excised 2021 thigh to knee

## 2021-07-18 NOTE — ED PROVIDER NOTE - OBJECTIVE STATEMENT
Patient c/o right ankle pain and redness for 3 days. Patient had right TKR 6 days ago. D/c'ed 4 days ago. Now having progressive redness and pain to right ankle, distal shin. Patient states she always had some redness to area, but now more red and painful.  No fever. Pain in knee is improving after surgery. No CP, no SOB

## 2021-07-18 NOTE — ED PROVIDER NOTE - LOWER EXTREMITY EXAM, RIGHT
erythema, increased warmth and tenderness to distal shin. Comparitively more pronounced than left leg. NVI distally/SWELLING/TENDERNESS

## 2021-07-18 NOTE — ED ADULT NURSE NOTE - CHPI ED NUR SYMPTOMS POS
hx Right TKR on Monday. presents c/o right ankle redness, swelling, hot since Thursday. Oxycodone po w/out relief./EDEMA/JOINT SWELLING/PAIN/TENDERNESS hx Right TKR on Monday. presents c/o right ankle redness, swelling, hot since Thursday. Oxycodone po w/out relief./EDEMA/INFLAMMATION/JOINT SWELLING/PAIN/TENDERNESS

## 2021-07-18 NOTE — ED PROVIDER NOTE - PATIENT PORTAL LINK FT
You can access the FollowMyHealth Patient Portal offered by [facility] by registering at the following website: [email or web address]. By joining [employer]’s FollowMyHealth portal, you will also be able to view your health information using other applications (apps) compatible with our system.

## 2021-07-19 ENCOUNTER — NON-APPOINTMENT (OUTPATIENT)
Age: 61
End: 2021-07-19

## 2021-07-19 ENCOUNTER — INPATIENT (INPATIENT)
Facility: HOSPITAL | Age: 61
LOS: 6 days | Discharge: HOME | DRG: 603 | End: 2021-07-26
Attending: HOSPITALIST | Admitting: HOSPITALIST
Payer: COMMERCIAL

## 2021-07-19 VITALS
TEMPERATURE: 99 F | HEIGHT: 63 IN | SYSTOLIC BLOOD PRESSURE: 116 MMHG | WEIGHT: 259.93 LBS | HEART RATE: 75 BPM | RESPIRATION RATE: 16 BRPM | DIASTOLIC BLOOD PRESSURE: 57 MMHG | OXYGEN SATURATION: 98 %

## 2021-07-19 DIAGNOSIS — Z98.890 OTHER SPECIFIED POSTPROCEDURAL STATES: Chronic | ICD-10-CM

## 2021-07-19 DIAGNOSIS — Z96.653 PRESENCE OF ARTIFICIAL KNEE JOINT, BILATERAL: Chronic | ICD-10-CM

## 2021-07-19 DIAGNOSIS — Z96.659 PRESENCE OF UNSPECIFIED ARTIFICIAL KNEE JOINT: Chronic | ICD-10-CM

## 2021-07-19 DIAGNOSIS — Z98.891 HISTORY OF UTERINE SCAR FROM PREVIOUS SURGERY: Chronic | ICD-10-CM

## 2021-07-19 DIAGNOSIS — Z90.710 ACQUIRED ABSENCE OF BOTH CERVIX AND UTERUS: Chronic | ICD-10-CM

## 2021-07-19 DIAGNOSIS — I83.90 ASYMPTOMATIC VARICOSE VEINS OF UNSPECIFIED LOWER EXTREMITY: Chronic | ICD-10-CM

## 2021-07-19 DIAGNOSIS — L03.115 CELLULITIS OF RIGHT LOWER LIMB: ICD-10-CM

## 2021-07-19 LAB
ALBUMIN SERPL ELPH-MCNC: 3.2 G/DL — LOW (ref 3.3–5)
ALP SERPL-CCNC: 119 U/L — SIGNIFICANT CHANGE UP (ref 30–120)
ALT FLD-CCNC: 27 U/L DA — SIGNIFICANT CHANGE UP (ref 10–60)
ANION GAP SERPL CALC-SCNC: 8 MMOL/L — SIGNIFICANT CHANGE UP (ref 5–17)
AST SERPL-CCNC: 24 U/L — SIGNIFICANT CHANGE UP (ref 10–40)
BASOPHILS # BLD AUTO: 0.07 K/UL — SIGNIFICANT CHANGE UP (ref 0–0.2)
BASOPHILS NFR BLD AUTO: 0.5 % — SIGNIFICANT CHANGE UP (ref 0–2)
BILIRUB SERPL-MCNC: 0.5 MG/DL — SIGNIFICANT CHANGE UP (ref 0.2–1.2)
BUN SERPL-MCNC: 14 MG/DL — SIGNIFICANT CHANGE UP (ref 7–23)
CALCIUM SERPL-MCNC: 9 MG/DL — SIGNIFICANT CHANGE UP (ref 8.4–10.5)
CHLORIDE SERPL-SCNC: 101 MMOL/L — SIGNIFICANT CHANGE UP (ref 96–108)
CO2 SERPL-SCNC: 30 MMOL/L — SIGNIFICANT CHANGE UP (ref 22–31)
CREAT SERPL-MCNC: 0.81 MG/DL — SIGNIFICANT CHANGE UP (ref 0.5–1.3)
CULTURE RESULTS: SIGNIFICANT CHANGE UP
EOSINOPHIL # BLD AUTO: 0.28 K/UL — SIGNIFICANT CHANGE UP (ref 0–0.5)
EOSINOPHIL NFR BLD AUTO: 2.2 % — SIGNIFICANT CHANGE UP (ref 0–6)
ERYTHROCYTE [SEDIMENTATION RATE] IN BLOOD: 28 MM/HR — HIGH (ref 0–20)
GLUCOSE SERPL-MCNC: 149 MG/DL — HIGH (ref 70–99)
HCT VFR BLD CALC: 32.4 % — LOW (ref 34.5–45)
HGB BLD-MCNC: 10.3 G/DL — LOW (ref 11.5–15.5)
IMM GRANULOCYTES NFR BLD AUTO: 1.5 % — SIGNIFICANT CHANGE UP (ref 0–1.5)
LACTATE SERPL-SCNC: 1.5 MMOL/L — SIGNIFICANT CHANGE UP (ref 0.7–2)
LYMPHOCYTES # BLD AUTO: 1.74 K/UL — SIGNIFICANT CHANGE UP (ref 1–3.3)
LYMPHOCYTES # BLD AUTO: 13.7 % — SIGNIFICANT CHANGE UP (ref 13–44)
MCHC RBC-ENTMCNC: 27.4 PG — SIGNIFICANT CHANGE UP (ref 27–34)
MCHC RBC-ENTMCNC: 31.8 GM/DL — LOW (ref 32–36)
MCV RBC AUTO: 86.2 FL — SIGNIFICANT CHANGE UP (ref 80–100)
MONOCYTES # BLD AUTO: 0.76 K/UL — SIGNIFICANT CHANGE UP (ref 0–0.9)
MONOCYTES NFR BLD AUTO: 6 % — SIGNIFICANT CHANGE UP (ref 2–14)
NEUTROPHILS # BLD AUTO: 9.69 K/UL — HIGH (ref 1.8–7.4)
NEUTROPHILS NFR BLD AUTO: 76.1 % — SIGNIFICANT CHANGE UP (ref 43–77)
NRBC # BLD: 0 /100 WBCS — SIGNIFICANT CHANGE UP (ref 0–0)
PLATELET # BLD AUTO: 317 K/UL — SIGNIFICANT CHANGE UP (ref 150–400)
POTASSIUM SERPL-MCNC: 3.7 MMOL/L — SIGNIFICANT CHANGE UP (ref 3.5–5.3)
POTASSIUM SERPL-SCNC: 3.7 MMOL/L — SIGNIFICANT CHANGE UP (ref 3.5–5.3)
PROT SERPL-MCNC: 6.5 G/DL — SIGNIFICANT CHANGE UP (ref 6–8.3)
RBC # BLD: 3.76 M/UL — LOW (ref 3.8–5.2)
RBC # FLD: 14 % — SIGNIFICANT CHANGE UP (ref 10.3–14.5)
SARS-COV-2 RNA SPEC QL NAA+PROBE: SIGNIFICANT CHANGE UP
SODIUM SERPL-SCNC: 139 MMOL/L — SIGNIFICANT CHANGE UP (ref 135–145)
SPECIMEN SOURCE: SIGNIFICANT CHANGE UP
WBC # BLD: 12.73 K/UL — HIGH (ref 3.8–10.5)
WBC # FLD AUTO: 12.73 K/UL — HIGH (ref 3.8–10.5)

## 2021-07-19 PROCEDURE — 71045 X-RAY EXAM CHEST 1 VIEW: CPT | Mod: 26

## 2021-07-19 PROCEDURE — 99285 EMERGENCY DEPT VISIT HI MDM: CPT

## 2021-07-19 PROCEDURE — 93010 ELECTROCARDIOGRAM REPORT: CPT

## 2021-07-19 PROCEDURE — 99223 1ST HOSP IP/OBS HIGH 75: CPT

## 2021-07-19 RX ORDER — LISINOPRIL 2.5 MG/1
5 TABLET ORAL DAILY
Refills: 0 | Status: DISCONTINUED | OUTPATIENT
Start: 2021-07-19 | End: 2021-07-26

## 2021-07-19 RX ORDER — LEVOTHYROXINE SODIUM 125 MCG
1 TABLET ORAL
Qty: 0 | Refills: 0 | DISCHARGE

## 2021-07-19 RX ORDER — ACETAMINOPHEN 500 MG
650 TABLET ORAL EVERY 6 HOURS
Refills: 0 | Status: DISCONTINUED | OUTPATIENT
Start: 2021-07-19 | End: 2021-07-26

## 2021-07-19 RX ORDER — LEVOTHYROXINE SODIUM 125 MCG
125 TABLET ORAL DAILY
Refills: 0 | Status: DISCONTINUED | OUTPATIENT
Start: 2021-07-19 | End: 2021-07-26

## 2021-07-19 RX ORDER — ONDANSETRON 8 MG/1
4 TABLET, FILM COATED ORAL EVERY 8 HOURS
Refills: 0 | Status: DISCONTINUED | OUTPATIENT
Start: 2021-07-19 | End: 2021-07-26

## 2021-07-19 RX ORDER — GLUCAGON INJECTION, SOLUTION 0.5 MG/.1ML
1 INJECTION, SOLUTION SUBCUTANEOUS ONCE
Refills: 0 | Status: DISCONTINUED | OUTPATIENT
Start: 2021-07-19 | End: 2021-07-26

## 2021-07-19 RX ORDER — SODIUM CHLORIDE 9 MG/ML
1000 INJECTION, SOLUTION INTRAVENOUS
Refills: 0 | Status: DISCONTINUED | OUTPATIENT
Start: 2021-07-19 | End: 2021-07-19

## 2021-07-19 RX ORDER — DEXTROSE 50 % IN WATER 50 %
25 SYRINGE (ML) INTRAVENOUS ONCE
Refills: 0 | Status: DISCONTINUED | OUTPATIENT
Start: 2021-07-19 | End: 2021-07-19

## 2021-07-19 RX ORDER — FLUOXETINE HCL 10 MG
40 CAPSULE ORAL DAILY
Refills: 0 | Status: DISCONTINUED | OUTPATIENT
Start: 2021-07-19 | End: 2021-07-26

## 2021-07-19 RX ORDER — ATORVASTATIN CALCIUM 80 MG/1
40 TABLET, FILM COATED ORAL AT BEDTIME
Refills: 0 | Status: DISCONTINUED | OUTPATIENT
Start: 2021-07-19 | End: 2021-07-26

## 2021-07-19 RX ORDER — INSULIN LISPRO 100/ML
VIAL (ML) SUBCUTANEOUS AT BEDTIME
Refills: 0 | Status: DISCONTINUED | OUTPATIENT
Start: 2021-07-19 | End: 2021-07-19

## 2021-07-19 RX ORDER — APIXABAN 2.5 MG/1
5 TABLET, FILM COATED ORAL EVERY 12 HOURS
Refills: 0 | Status: DISCONTINUED | OUTPATIENT
Start: 2021-07-19 | End: 2021-07-26

## 2021-07-19 RX ORDER — VANCOMYCIN HCL 1 G
1000 VIAL (EA) INTRAVENOUS ONCE
Refills: 0 | Status: COMPLETED | OUTPATIENT
Start: 2021-07-19 | End: 2021-07-19

## 2021-07-19 RX ORDER — OMEPRAZOLE 10 MG/1
1 CAPSULE, DELAYED RELEASE ORAL
Qty: 0 | Refills: 0 | DISCHARGE

## 2021-07-19 RX ORDER — ATORVASTATIN CALCIUM 80 MG/1
1 TABLET, FILM COATED ORAL
Qty: 0 | Refills: 0 | DISCHARGE

## 2021-07-19 RX ORDER — INSULIN LISPRO 100/ML
VIAL (ML) SUBCUTANEOUS
Refills: 0 | Status: DISCONTINUED | OUTPATIENT
Start: 2021-07-19 | End: 2021-07-19

## 2021-07-19 RX ORDER — METFORMIN HYDROCHLORIDE 850 MG/1
1 TABLET ORAL
Qty: 0 | Refills: 0 | DISCHARGE

## 2021-07-19 RX ORDER — ACETAMINOPHEN 500 MG
650 TABLET ORAL EVERY 6 HOURS
Refills: 0 | Status: DISCONTINUED | OUTPATIENT
Start: 2021-07-19 | End: 2021-07-25

## 2021-07-19 RX ORDER — PANTOPRAZOLE SODIUM 20 MG/1
40 TABLET, DELAYED RELEASE ORAL
Refills: 0 | Status: DISCONTINUED | OUTPATIENT
Start: 2021-07-19 | End: 2021-07-26

## 2021-07-19 RX ORDER — DEXTROSE 50 % IN WATER 50 %
12.5 SYRINGE (ML) INTRAVENOUS ONCE
Refills: 0 | Status: DISCONTINUED | OUTPATIENT
Start: 2021-07-19 | End: 2021-07-19

## 2021-07-19 RX ORDER — OXYCODONE HYDROCHLORIDE 5 MG/1
5 TABLET ORAL EVERY 4 HOURS
Refills: 0 | Status: DISCONTINUED | OUTPATIENT
Start: 2021-07-19 | End: 2021-07-26

## 2021-07-19 RX ORDER — LISINOPRIL 2.5 MG/1
1 TABLET ORAL
Qty: 0 | Refills: 0 | DISCHARGE

## 2021-07-19 RX ORDER — LANOLIN ALCOHOL/MO/W.PET/CERES
3 CREAM (GRAM) TOPICAL AT BEDTIME
Refills: 0 | Status: DISCONTINUED | OUTPATIENT
Start: 2021-07-19 | End: 2021-07-26

## 2021-07-19 RX ORDER — SENNA PLUS 8.6 MG/1
2 TABLET ORAL AT BEDTIME
Refills: 0 | Status: DISCONTINUED | OUTPATIENT
Start: 2021-07-19 | End: 2021-07-26

## 2021-07-19 RX ORDER — PIPERACILLIN AND TAZOBACTAM 4; .5 G/20ML; G/20ML
3.38 INJECTION, POWDER, LYOPHILIZED, FOR SOLUTION INTRAVENOUS ONCE
Refills: 0 | Status: COMPLETED | OUTPATIENT
Start: 2021-07-19 | End: 2021-07-19

## 2021-07-19 RX ORDER — PIPERACILLIN AND TAZOBACTAM 4; .5 G/20ML; G/20ML
3.38 INJECTION, POWDER, LYOPHILIZED, FOR SOLUTION INTRAVENOUS EVERY 8 HOURS
Refills: 0 | Status: DISCONTINUED | OUTPATIENT
Start: 2021-07-19 | End: 2021-07-19

## 2021-07-19 RX ORDER — CEFAZOLIN SODIUM 1 G
2000 VIAL (EA) INJECTION EVERY 8 HOURS
Refills: 0 | Status: DISCONTINUED | OUTPATIENT
Start: 2021-07-19 | End: 2021-07-26

## 2021-07-19 RX ORDER — FLUOXETINE HCL 10 MG
1 CAPSULE ORAL
Qty: 0 | Refills: 0 | DISCHARGE

## 2021-07-19 RX ORDER — DEXTROSE 50 % IN WATER 50 %
15 SYRINGE (ML) INTRAVENOUS ONCE
Refills: 0 | Status: DISCONTINUED | OUTPATIENT
Start: 2021-07-19 | End: 2021-07-19

## 2021-07-19 RX ADMIN — PIPERACILLIN AND TAZOBACTAM 200 GRAM(S): 4; .5 INJECTION, POWDER, LYOPHILIZED, FOR SOLUTION INTRAVENOUS at 12:18

## 2021-07-19 RX ADMIN — Medication 250 MILLIGRAM(S): at 12:55

## 2021-07-19 RX ADMIN — OXYCODONE HYDROCHLORIDE 5 MILLIGRAM(S): 5 TABLET ORAL at 18:39

## 2021-07-19 RX ADMIN — OXYCODONE HYDROCHLORIDE 5 MILLIGRAM(S): 5 TABLET ORAL at 18:09

## 2021-07-19 RX ADMIN — ATORVASTATIN CALCIUM 40 MILLIGRAM(S): 80 TABLET, FILM COATED ORAL at 22:29

## 2021-07-19 RX ADMIN — OXYCODONE HYDROCHLORIDE 5 MILLIGRAM(S): 5 TABLET ORAL at 14:15

## 2021-07-19 RX ADMIN — Medication 100 MILLIGRAM(S): at 22:29

## 2021-07-19 RX ADMIN — OXYCODONE HYDROCHLORIDE 5 MILLIGRAM(S): 5 TABLET ORAL at 22:29

## 2021-07-19 RX ADMIN — Medication 50 MILLIGRAM(S): at 18:09

## 2021-07-19 RX ADMIN — OXYCODONE HYDROCHLORIDE 5 MILLIGRAM(S): 5 TABLET ORAL at 23:29

## 2021-07-19 RX ADMIN — SENNA PLUS 2 TABLET(S): 8.6 TABLET ORAL at 22:29

## 2021-07-19 RX ADMIN — APIXABAN 5 MILLIGRAM(S): 2.5 TABLET, FILM COATED ORAL at 22:29

## 2021-07-19 RX ADMIN — OXYCODONE HYDROCHLORIDE 5 MILLIGRAM(S): 5 TABLET ORAL at 13:45

## 2021-07-19 RX ADMIN — Medication 3 MILLIGRAM(S): at 22:37

## 2021-07-19 NOTE — ED PROVIDER NOTE - CONSTITUTIONAL, MLM
Well appearing, awake, alert, oriented to person, place, time/situation and in no apparent distress obese female normal...

## 2021-07-19 NOTE — H&P ADULT - NSHPREVIEWOFSYSTEMS_GEN_ALL_CORE
CONSTITUTIONAL: denies fever, chills, fatigue, weakness  HEENT: denies blurred vision, sore throat  SKIN: see HPI  CARDIOVASCULAR: denies chest pain, chest pressure, palpitations  RESPIRATORY: denies shortness of breath, sputum production  GASTROINTESTINAL: denies nausea, vomiting, diarrhea, abdominal pain, melena, hematochezia  GENITOURINARY: denies dysuria, discharge  NEUROLOGICAL: denies numbness, headache, focal weakness  MUSCULOSKELETAL: denies new joint pain, muscle aches  HEMATOLOGIC: denies gross bleeding, bruising  LYMPHATICS: R shin swelling and redness  PSYCHIATRIC: denies recent changes in anxiety, depression  ENDOCRINOLOGIC: denies sweating, cold or heat intolerance

## 2021-07-19 NOTE — ED ADULT NURSE NOTE - NS TRANSFER PATIENT BELONGINGS
large floral bag/Wrist Watch/Cell Phone/PDA (specify)/Electronic Device (specify)/Other belongings/Clothing

## 2021-07-19 NOTE — H&P ADULT - NSICDXPASTMEDICALHX_GEN_ALL_CORE_FT
PAST MEDICAL HISTORY:  Acid reflux     History of fibromyalgia     History of varicose veins     Hypothyroid     Osteoarthritis     Pneumonia due to COVID-19 virus 3/2021  inpatient at [facility]    Severe obesity (BMI >= 40)     Type 2 diabetes mellitus     Uterine hemorrhage     Venous insufficiency of leg

## 2021-07-19 NOTE — CONSULT NOTE ADULT - SUBJECTIVE AND OBJECTIVE BOX
HPI:  62 y/o F with PMH of DM type 2, Dyslipidemia, Hypothyroidism, COVID PNA 4 months ago, Chronic Venous Insufficiency s/p right leg phlebectomy in April, Fibromyalgia, GERD, OA, and Morbid Obesity recent R TKA on 2021 by Dr Low who presented to the hospital with CC of worsening RLE erythema and pain. Was seen in ED on Saturday and was discharged on PO Keflex. Noted to have worsening Right LE erythema and swelling/pain. Thinks she was bitten by mosquito and she scratched her skin. Denies fever chills n/v/d Denies right knee pain. Surgical site healing well.    Infectious Disease consult was called to evaluate pt and for antibiotic management.      Past Medical & Surgical Hx:  PAST MEDICAL & SURGICAL HISTORY:  Pneumonia due to COVID-19 virus  3/2021  inpatient at [facility]  Type 2 diabetes mellitus  Hypothyroid  Acid reflux  Venous insufficiency of leg  History of varicose veins  History of fibromyalgia  Osteoarthritis  Uterine hemorrhage  Severe obesity (BMI &gt;= 40)  H/O:   H/O: hysterectomy  S/P FESS (functional endoscopic sinus surgery)  2007  History of myringotomy  2006  Varicose vein of leg  right leg, excised 2021 thigh to knee  History of total knee replacement  bilateral 2005  History of revision of total replacement of knee joint  right 21        Social History--  EtOH: denies   Tobacco: denies   Drug Use: denies       FAMILY HISTORY:  FHx: lung cancer (Mother)    Family hx of lung cancer (Sibling)    Family hx of melanoma (Father)        Allergies  No Known Allergies    Intolerances  Augmentin (Vomiting)      Home Medications:  acetaminophen 500 mg oral tablet: 2 tab(s) orally every 8 hours (2021 12:11)  atorvastatin 40 mg oral tablet: 1 tab(s) orally once a day (2021 12:11)  FLUoxetine 40 mg oral capsule: 1 cap(s) orally once a day (2021 12:11)  levothyroxine 125 mcg (0.125 mg) oral tablet: 1 tab(s) orally once a day (2021 12:11)  lisinopril 5 mg oral tablet: 1 tab(s) orally once a day (2021 12:11)  Lyrica 50 mg oral capsule: 1 cap(s) orally 3 times a day  2 in am and 1 in pm (2021 12:11)  metFORMIN 750 mg oral tablet, extended release: 1 tab(s) orally once a day (2021 12:11)  omeprazole 40 mg oral delayed release capsule: 1 cap(s) orally once a day (2021 12:11)  senna oral tablet: 2 tab(s) orally once a day (at bedtime) (2021 12:11)    Current Inpatient Medications :    ANTIBIOTICS:   piperacillin/tazobactam IVPB.. 3.375 Gram(s) IV Intermittent every 8 hours      OTHER RELEVANT MEDICATIONS :  acetaminophen   Tablet .. 650 milliGRAM(s) Oral every 6 hours PRN  acetaminophen   Tablet .. 650 milliGRAM(s) Oral every 6 hours PRN  aluminum hydroxide/magnesium hydroxide/simethicone Suspension 30 milliLiter(s) Oral every 4 hours PRN  apixaban 5 milliGRAM(s) Oral every 12 hours  atorvastatin 40 milliGRAM(s) Oral at bedtime  dextrose 40% Gel 15 Gram(s) Oral once  dextrose 5%. 1000 milliLiter(s) IV Continuous <Continuous>  dextrose 5%. 1000 milliLiter(s) IV Continuous <Continuous>  dextrose 50% Injectable 25 Gram(s) IV Push once  dextrose 50% Injectable 12.5 Gram(s) IV Push once  dextrose 50% Injectable 25 Gram(s) IV Push once  FLUoxetine 40 milliGRAM(s) Oral daily  glucagon  Injectable 1 milliGRAM(s) IntraMuscular once  insulin lispro (ADMELOG) corrective regimen sliding scale   SubCutaneous three times a day before meals  insulin lispro (ADMELOG) corrective regimen sliding scale   SubCutaneous at bedtime  levothyroxine 125 MICROGram(s) Oral daily  lisinopril 5 milliGRAM(s) Oral daily  melatonin 3 milliGRAM(s) Oral at bedtime PRN  ondansetron Injectable 4 milliGRAM(s) IV Push every 8 hours PRN  oxyCODONE    IR 5 milliGRAM(s) Oral every 4 hours PRN  pantoprazole    Tablet 40 milliGRAM(s) Oral before breakfast  pregabalin 100 milliGRAM(s) Oral daily  pregabalin 50 milliGRAM(s) Oral <User Schedule>  senna 2 Tablet(s) Oral at bedtime        ROS:  CONSTITUTIONAL:  Negative fever or chills  EYES:  Negative  blurry vision or double vision  CARDIOVASCULAR:  Negative for chest pain or palpitations  RESPIRATORY:  Negative for cough, wheezing, or SOB   GASTROINTESTINAL:  Negative for nausea, vomiting, diarrhea, constipation, or abdominal pain  GENITOURINARY:  Negative frequency, urgency , dysuria or hematuria   NEUROLOGIC:  No headache, confusion, dizziness, lightheadedness  All other systems were reviewed and are negative        I&O's Detail    2021 07:01  -  2021 15:01  --------------------------------------------------------  IN:    IV PiggyBack: 350 mL  Total IN: 350 mL    OUT:  Total OUT: 0 mL    Total NET: 350 mL          Physical Exam:  Vital Signs Last 24 Hrs  T(C): 37.1 (2021 11:40), Max: 37.1 (2021 11:40)  T(F): 98.7 (2021 11:40), Max: 98.7 (2021 11:40)  HR: 75 (2021 11:40) (75 - 75)  BP: 116/57 (2021 11:40) (116/57 - 116/57)  RR: 16 (2021 11:40) (16 - 16)  SpO2: 98% (2021 11:40) (98% - 98%)  Height (cm): 160 ( @ 11:40)  Weight (kg): 117.9 ( @ 11:40)  BMI (kg/m2): 46.1 ( @ 11:40)  BSA (m2): 2.16 ( @ 11:40)    General: no acute distress  Neck: supple, trachea midline  Lungs: clear, no wheeze/rhonchi  Cardiovascular: regular rate and rhythm, S1 S2  Abdomen: soft, nontender, ND, bowel sounds normal  Neurological:  alert and oriented x3  Skin: no rash  Extremities: Right LE erythema warm and tender  Right TKR surgical site c/d/i Nontender no drainage    Labs:                        10.3   12.73 )-----------( 317      ( 2021 12:10 )             32.4         139  |  101  |  14  ----------------------------<  149<H>  3.7   |  30  |  0.81    Ca    9.0      2021 12:10    TPro  6.5  /  Alb  3.2<L>  /  TBili  0.5  /  DBili  x   /  AST  24  /  ALT  27  /  AlkPhos  119  07-19      RECENT CULTURES:    Culture - Urine (collected 2021 13:19)  Source: .Urine Clean Catch (Midstream)  Final Report (2021 11:43):    <10,000 CFU/mL Normal Urogenital Angelita    Culture - Blood (collected 2021 13:12)  Source: .Blood Blood-Peripheral  Preliminary Report (2021 14:01):    No growth to date.    Culture - Blood (collected 2021 13:12)  Source: .Blood Blood-Peripheral  Preliminary Report (2021 14:01):    No growth to date.    MRSA/MSSA PCR (21 @ 14:47)    MRSA PCR Result.: [facility]: MRSA/MSSA PCR assay is a qualitative in vitro diagnostic test for the  direct detection and differentiation of methicillin-resistant  Staphylococcus aureus (MRSA) from Staphylococcus aureus (SA). The assay  detects DNA from nasal swabs in patients atrisk for nasal colonization.  It is not intended to diagnose MRSA or SA infections nor guide or monitor  treatment for MRSA/SA infections. A negative result does not preclude  nasal colonization. The assay is FDA-approved and its performance has  been established by [employer], [place] and the [facility], [place], [place].    Staph Aureus PCR Result: [facility]          RADIOLOGY & ADDITIONAL STUDIES:    EXAM:  US DPLX LWR EXT VEINS LTD RT                                  PROCEDURE DATE:  2021          INTERPRETATION:  CLINICAL INFORMATION: Right lower extremity pain.    COMPARISON: None available.    TECHNIQUE: Duplex sonography of the RIGHTLOWER extremity veins with color and spectral Doppler, with and without compression.    FINDINGS:    There is normal compressibility of the right common femoral, femoral and popliteal veins.  The contralateral common femoral vein is patent.  Doppler examination shows normal spontaneous and phasic flow.    No calf vein thrombosis is detected, although the peroneal vein was not visualized.    There is subcutaneous edema in the calf.    IMPRESSION:  No evidence of right lower extremity deep venous thrombosis.  Suboptimal evaluation of the calf veins.    Assessment :   62 y/o F with PMH of DM type 2, Dyslipidemia, Hypothyroidism, COVID PNA 4 months ago, Chronic Venous Insufficiency s/p right leg phlebectomy in April, Fibromyalgia, GERD, OA, and Morbid Obesity recent R TKA on 2021 by Dr Low admitted with worsening RLE cellulitis with lymphangitis  Poss Strep    Plan :   Ancef  Fu cultures  Trend temps and cbc  Elevate leg      Continue with present regiment .  Approptiate use of antibiotics and adverse effects reviewed.      I have discussed the above plan of care with patient in detail. She expressed understanding of the treatment plan . Risks, benefits and alternatives discussed in detail. I have asked if she has any questions or concerns and appropriately addressed them to the best of my ability .      > 45 minutes spent in direct patient care reviewing  the notes, lab data/ imaging , discussion with multidisciplinary team. All questions were addressed and answered to the best of my capacity .    Thank you for allowing me to participate in the care of your patient .      Berenice Jones MD  Infectious Disease  [phone]

## 2021-07-19 NOTE — ED PROVIDER NOTE - CLINICAL SUMMARY MEDICAL DECISION MAKING FREE TEXT BOX
Pt is a 62 yo female with right leg cellulitis s/p TKA failed out pt worsening   spoke with NP Kaitlin admission IV abx and admission

## 2021-07-19 NOTE — ED ADULT NURSE NOTE - OBJECTIVE STATEMENT
Patient presents with right lower leg redness, swelling and warmth that started on 7/15 with itching lateral aspect and patient thought she had an insect bite. Patient is post-op 7/12/21 right total knee replacement revision by Dr. Low. Patient states the knee is healing well. Incision healing well, wound intact, no redness, swelling or drainage noted with wound closure with glue and steri strips in place. Redness, warmth and swelling starts mid calf/shin and goes to ankel with redness but swelling noted of foot. Reddened area with some small intact blistering. Mild redness noted to left lower leg which patient states is chronic, no warmth, no swelling. Patient was seen in the ED yesterday early AM and was started on oral antibiotics, today patient sent a phot of her leg to Dr. Low who then sent her to the ED to be admitted. Denies any fever. Recovery otherwise going well, PT progressing. Patient able to ambulate with walker.

## 2021-07-19 NOTE — ED PROVIDER NOTE - ATTENDING CONTRIBUTION TO CARE
60 yo F with recent rt knee replacement seen in ED yesterday for cellulitis rt lower leg. Spoke with Dr Low today who wanted her to get admitted for IV antibiotics.  Pt denies fever, pain or other symptom.  PE Afebrile, NAD  Heart and lungs normal. Ext bilat knee scars.   Rt lower leg redness warmth and blistering. FROM, pulses sensation intact.  Imp Cellulitis.  Plan admit.      I performed a history and physical exam of the patient and discussed their management with the advanced care provider. I reviewed the advanced care provider's note and agree with the documented findings and plan of care. My medical decision making and objective findings are found above.

## 2021-07-19 NOTE — ED ADULT NURSE REASSESSMENT NOTE - NS ED NURSE REASSESS COMMENT FT1
Assumed care of patient for coverage. VSS. Meal offered. Recently medicated as per orders for pain. Will re evaluate as per protocol.

## 2021-07-19 NOTE — H&P ADULT - HISTORY OF PRESENT ILLNESS
This is a 62 y/o F with PMH of DM type 2, Dyslipidemia, Hypothyroidism, COVID PNA 4 months ago, Chronic Venous Insufficiency s/p right leg phlebectomy in April, Fibromyalgia, GERD, OA, and Morbid Obesity who presented for RLE cellulitis.   Patient states that she began to notice RLE erythema and pain since this past Thursday. She states the erythema has been getting worse, she initially thought she got bit by an insect on her RLE, she presented to the ER on Saturday and was discharged on PO Keflex. She has been compliant with her oral antibiotics but the erythema has gotten worse. She also has a throbbing pain on the R anterior shin. She is compliant with her Eliquis dosing.  She has no other complaints.   She has not noted any drainage from her R TKA incision site. She had a R TKA on July 12, 2021.   RLE Duplex shows No evidence of right lower extremity deep venous thrombosis. Suboptimal evaluation of the calf veins.

## 2021-07-19 NOTE — PHARMACY COMMUNICATION NOTE - COMMENTS
Clarified dose with Addis; She said Dr. Low wants higher 5mg BID because Patient is at a higher risk for DVT/PE (will treat for 4 weeks and will be evaluated in the office. )

## 2021-07-19 NOTE — ED ADULT NURSE NOTE - NSIMPLEMENTINTERV_GEN_ALL_ED
Implemented All Fall with Harm Risk Interventions:  [place] to call system. Call bell, personal items and telephone within reach. Instruct patient to call for assistance. Room bathroom lighting operational. Non-slip footwear when patient is off stretcher. Physically safe environment: no spills, clutter or unnecessary equipment. Stretcher in lowest position, wheels locked, appropriate side rails in place. Provide visual cue, wrist band, yellow gown, etc. Monitor gait and stability. Monitor for mental status changes and reorient to person, place, and time. Review medications for side effects contributing to fall risk. Reinforce activity limits and safety measures with patient and family. Provide visual clues: red socks.

## 2021-07-19 NOTE — ED PROVIDER NOTE - FAMILY HISTORY
Mother  Still living? No  FHx: lung cancer, Age at diagnosis: Age Unknown     Sibling  Still living? No  Family hx of lung cancer, Age at diagnosis: Age Unknown     Father  Still living? No  Family hx of melanoma, Age at diagnosis: Age Unknown

## 2021-07-19 NOTE — POST DISCHARGE NOTE - DETAILS:
Patient spoke with Dr. Low who is sending her back to the ED for admission fro cellulitis. Patient is on her way here.

## 2021-07-19 NOTE — ED ADULT NURSE NOTE - NSFALLRSKASSESASSIST_ED_ALL_ED
known CVA with covid related embolic stroke, severe basilar artery stenosis   severe covidARDS, driving pressure 14, plateau 29 on peep 14 - relatively preserved compliance --> CTPA poor visibility no PE in main vessels.     shock ? PE plus cytokine related in the setting of superimposed bacterial pna (GPC in pairs)_ or just hyperinflamm covid, possibly also hypovolemic(improved BP with fluids).  Hypernatremia - likely hypovolemic  Hypercoag - check teg, cont eliquis for now; dopplers of LE check  Lung protective ventilation    cc time 60 mns known CVA with covid related embolic stroke, severe basilar artery stenosis   severe covidARDS, driving pressure 14, plateau 29 on peep 14 - relatively preserved compliance --> CTPA poor visibility no PE in main vessels.     shock ? PE plus cytokine related in the setting of superimposed bacterial pna (GPC in pairs)_ or just hyperinflamm covid, possibly also hypovolemic(improved BP with fluids). 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 Now with pulse, bleed from ETT - no pneumothorax on US. Family on their way in. yes

## 2021-07-19 NOTE — ED ADULT NURSE NOTE - PMH
Acid reflux    History of fibromyalgia    History of varicose veins    Hypothyroid    Osteoarthritis    Pneumonia due to COVID-19 virus  3/2021  inpatient at [facility]  Severe obesity (BMI >= 40)    Type 2 diabetes mellitus    Uterine hemorrhage    Venous insufficiency of leg

## 2021-07-19 NOTE — H&P ADULT - ASSESSMENT
This is a 62 y/o F with PMH of DM type 2, Dyslipidemia, Hypothyroidism, COVID PNA 4 months ago, Chronic Venous Insufficiency s/p right leg phlebectomy in April, Fibromyalgia, GERD, OA, and Morbid Obesity who presented for RLE cellulitis.     RLE cellulitis: does not meet sepsis criteria.   -continue on Zosyn  -check MRSA swab  -trend WBC count and monitor for fevers.   -f/u culture results  -ID Dr Jones consulted.     R TKA: performed on July 12, 2021 This is a 60 y/o F with PMH of DM type 2, Dyslipidemia, Hypothyroidism, COVID PNA 4 months ago, Chronic Venous Insufficiency s/p right leg phlebectomy in April, Fibromyalgia, GERD, OA, and Morbid Obesity who presented for RLE cellulitis.     RLE cellulitis: does not meet sepsis criteria.   -continue on Zosyn  -check MRSA swab  -trend WBC count and monitor for fevers.   -f/u culture results  -ID Dr Jones consulted.     R TKA: performed on July 12, 2021  -surgical site does not appear to be infected.   -orthopedics consulted  -continue Eliquis 5mg q12 h [dose was confirmed with patient and increased dose was chosen due to patients increased risk for VTE]    DM type 2: goal glucose 140-180 while inpatient  -correctional insulin ordered  -diabetic diet    Depression and Fibromyalgia: continue fluoxetine    Hypothyroidism: continue synthroid.     HLD: continue Lipitor    HTN: continue Lisinopril    GERD: continue Protonix    DVT ppx: continue Eliquis This is a 62 y/o F with PMH of DM type 2, Dyslipidemia, Hypothyroidism, COVID PNA 4 months ago, Chronic Venous Insufficiency s/p right leg phlebectomy in April, Fibromyalgia, GERD, OA, and Morbid Obesity who presented for RLE cellulitis.     RLE cellulitis: does not meet sepsis criteria.   -continue on Zosyn  -check MRSA swab  -trend WBC count and monitor for fevers.   -f/u culture results  -ID Dr Jones consulted.     R TKA: performed on July 12, 2021  -surgical site does not appear to be infected.   -orthopedics consulted  -continue Eliquis 5mg q12 h [dose was confirmed with patient and increased dose was chosen due to patients increased risk for VTE]    DM type 2: goal glucose 140-180 while inpatient  -correctional insulin ordered  -diabetic diet    Anemia: no signs or symptoms of active bleeding. Continue to monitor hgb.     Depression and Fibromyalgia: continue fluoxetine    Hypothyroidism: continue synthroid.     HLD: continue Lipitor    HTN: continue Lisinopril    GERD: continue Protonix    DVT ppx: continue Eliquis

## 2021-07-19 NOTE — ED PROVIDER NOTE - SKIN, MLM
Skin normal color for race, warm, dry and old surgical scar left knee right knee cdi healing well right shower shin with redness warmth ttp no open wounds nvi

## 2021-07-19 NOTE — ED PROVIDER NOTE - PMH
Acid reflux    History of fibromyalgia    History of varicose veins    Hypothyroid    Osteoarthritis    Pneumonia due to COVID-19 virus  3/2021  inpatient at [facility]  Severe obesity (BMI >= 40)    Type 2 diabetes mellitus    Uterine hemorrhage    Venous insufficiency of leg

## 2021-07-19 NOTE — ED ADULT NURSE NOTE - PSH
H/O:     H/O: hysterectomy    History of myringotomy  2006  History of revision of total replacement of knee joint  right 21  History of total knee replacement  bilateral   S/P FESS (functional endoscopic sinus surgery)  2007  Varicose vein of leg  right leg, excised 2021 thigh to knee

## 2021-07-19 NOTE — ED PROVIDER NOTE - OBJECTIVE STATEMENT
Pt is a 62 yo female with pmhx of Acid reflux History of fibromyalgia History of varicose veins Hypothyroid Osteoarthritis Pneumonia due to COVID-19 Type 2 diabetes mellitus Uterine hemorrhage Venous insufficiency of leg here admission for IV abx for right leg cellulitis. Patient had right TKR 7/12 and was seen in er yesterday had one dose of IV abx US and labs. Now having progressive redness and pain to right ankle, distal shin. Patient states she always had some redness to area, but now more red and painful.  No fever. No CP, no SOB. Pt was sent home on Keflex. Pt thinks started from mosquito bite because was very itchy a few days ago.     ortho Maranda  PCP Prabhjot

## 2021-07-19 NOTE — H&P ADULT - NSICDXPASTSURGICALHX_GEN_ALL_CORE_FT
PAST SURGICAL HISTORY:  H/O:      H/O: hysterectomy     History of myringotomy 2006    History of revision of total replacement of knee joint right 21    History of total knee replacement bilateral     S/P FESS (functional endoscopic sinus surgery) 2007    Varicose vein of leg right leg, excised 2021 thigh to knee

## 2021-07-19 NOTE — H&P ADULT - NSHPPHYSICALEXAM_GEN_ALL_CORE
T(C): 37.1 (07-19-21 @ 11:40), Max: 37.1 (07-19-21 @ 11:40)  HR: 75 (07-19-21 @ 11:40) (75 - 75)  BP: 116/57 (07-19-21 @ 11:40) (116/57 - 116/57)  RR: 16 (07-19-21 @ 11:40) (16 - 16)  SpO2: 98% (07-19-21 @ 11:40) (98% - 98%)  Wt(kg): --    Physical Exam:   GENERAL: well-groomed, well-developed, NAD  HEENT: head NC/AT; EOM intact, conjunctiva & sclera clear; hearing grossly intact, moist mucous membranes  NECK: supple, no JVD  RESPIRATORY: CTA B/L, no wheezing, rales, rhonchi or rubs  CARDIOVASCULAR: S1&S2, RRR, no murmurs or gallops  ABDOMEN: soft, non-tender, non-distended, + Bowel sounds x4 quadrants, no guarding, rebound or rigidity  MUSCULOSKELETAL:  RLE edema and erythema of anterior shin. R TKA site clean and dry without drainage.   LYMPH: no cervical lymphadenopathy  VASCULAR: Radial pulses 2+ bilaterally, no varicose veins   SKIN: warm and dry, color normal  NEUROLOGIC: AA&O X3, CN2-12 intact w/ no focal deficits, no sensory loss, motor Strength 5/5 in UE & LE B/L  Psych: Normal mood and affect, normal behavior

## 2021-07-20 ENCOUNTER — TRANSCRIPTION ENCOUNTER (OUTPATIENT)
Age: 61
End: 2021-07-20

## 2021-07-20 LAB
ANION GAP SERPL CALC-SCNC: 6 MMOL/L — SIGNIFICANT CHANGE UP (ref 5–17)
BASOPHILS # BLD AUTO: 0.06 K/UL — SIGNIFICANT CHANGE UP (ref 0–0.2)
BASOPHILS NFR BLD AUTO: 0.5 % — SIGNIFICANT CHANGE UP (ref 0–2)
BUN SERPL-MCNC: 13 MG/DL — SIGNIFICANT CHANGE UP (ref 7–23)
CALCIUM SERPL-MCNC: 8.7 MG/DL — SIGNIFICANT CHANGE UP (ref 8.4–10.5)
CHLORIDE SERPL-SCNC: 102 MMOL/L — SIGNIFICANT CHANGE UP (ref 96–108)
CO2 SERPL-SCNC: 32 MMOL/L — HIGH (ref 22–31)
COVID-19 SPIKE DOMAIN AB INTERP: POSITIVE
COVID-19 SPIKE DOMAIN ANTIBODY RESULT: >250 U/ML — HIGH
CREAT SERPL-MCNC: 0.9 MG/DL — SIGNIFICANT CHANGE UP (ref 0.5–1.3)
CRP SERPL-MCNC: 40 MG/L — HIGH
EOSINOPHIL # BLD AUTO: 0.28 K/UL — SIGNIFICANT CHANGE UP (ref 0–0.5)
EOSINOPHIL NFR BLD AUTO: 2.4 % — SIGNIFICANT CHANGE UP (ref 0–6)
GLUCOSE SERPL-MCNC: 124 MG/DL — HIGH (ref 70–99)
HCT VFR BLD CALC: 32.5 % — LOW (ref 34.5–45)
HCV AB S/CO SERPL IA: 0.06 S/CO — SIGNIFICANT CHANGE UP (ref 0–0.99)
HCV AB SERPL-IMP: SIGNIFICANT CHANGE UP
HGB BLD-MCNC: 10.1 G/DL — LOW (ref 11.5–15.5)
IMM GRANULOCYTES NFR BLD AUTO: 1.3 % — SIGNIFICANT CHANGE UP (ref 0–1.5)
LYMPHOCYTES # BLD AUTO: 1.8 K/UL — SIGNIFICANT CHANGE UP (ref 1–3.3)
LYMPHOCYTES # BLD AUTO: 15.2 % — SIGNIFICANT CHANGE UP (ref 13–44)
MCHC RBC-ENTMCNC: 26.9 PG — LOW (ref 27–34)
MCHC RBC-ENTMCNC: 31.1 GM/DL — LOW (ref 32–36)
MCV RBC AUTO: 86.4 FL — SIGNIFICANT CHANGE UP (ref 80–100)
MONOCYTES # BLD AUTO: 0.84 K/UL — SIGNIFICANT CHANGE UP (ref 0–0.9)
MONOCYTES NFR BLD AUTO: 7.1 % — SIGNIFICANT CHANGE UP (ref 2–14)
MRSA PCR RESULT.: SIGNIFICANT CHANGE UP
NEUTROPHILS # BLD AUTO: 8.68 K/UL — HIGH (ref 1.8–7.4)
NEUTROPHILS NFR BLD AUTO: 73.5 % — SIGNIFICANT CHANGE UP (ref 43–77)
NRBC # BLD: 0 /100 WBCS — SIGNIFICANT CHANGE UP (ref 0–0)
PLATELET # BLD AUTO: 331 K/UL — SIGNIFICANT CHANGE UP (ref 150–400)
POTASSIUM SERPL-MCNC: 4.1 MMOL/L — SIGNIFICANT CHANGE UP (ref 3.5–5.3)
POTASSIUM SERPL-SCNC: 4.1 MMOL/L — SIGNIFICANT CHANGE UP (ref 3.5–5.3)
RBC # BLD: 3.76 M/UL — LOW (ref 3.8–5.2)
RBC # FLD: 14.3 % — SIGNIFICANT CHANGE UP (ref 10.3–14.5)
S AUREUS DNA NOSE QL NAA+PROBE: SIGNIFICANT CHANGE UP
SARS-COV-2 IGG+IGM SERPL QL IA: >250 U/ML — HIGH
SARS-COV-2 IGG+IGM SERPL QL IA: POSITIVE
SODIUM SERPL-SCNC: 140 MMOL/L — SIGNIFICANT CHANGE UP (ref 135–145)
WBC # BLD: 11.81 K/UL — HIGH (ref 3.8–10.5)
WBC # FLD AUTO: 11.81 K/UL — HIGH (ref 3.8–10.5)

## 2021-07-20 PROCEDURE — 99232 SBSQ HOSP IP/OBS MODERATE 35: CPT

## 2021-07-20 RX ORDER — HYDROMORPHONE HYDROCHLORIDE 2 MG/ML
1 INJECTION INTRAMUSCULAR; INTRAVENOUS; SUBCUTANEOUS EVERY 8 HOURS
Refills: 0 | Status: DISCONTINUED | OUTPATIENT
Start: 2021-07-20 | End: 2021-07-26

## 2021-07-20 RX ADMIN — SENNA PLUS 2 TABLET(S): 8.6 TABLET ORAL at 21:48

## 2021-07-20 RX ADMIN — Medication 40 MILLIGRAM(S): at 11:29

## 2021-07-20 RX ADMIN — ATORVASTATIN CALCIUM 40 MILLIGRAM(S): 80 TABLET, FILM COATED ORAL at 21:47

## 2021-07-20 RX ADMIN — Medication 50 MILLIGRAM(S): at 17:20

## 2021-07-20 RX ADMIN — Medication 650 MILLIGRAM(S): at 13:08

## 2021-07-20 RX ADMIN — Medication 650 MILLIGRAM(S): at 14:08

## 2021-07-20 RX ADMIN — OXYCODONE HYDROCHLORIDE 5 MILLIGRAM(S): 5 TABLET ORAL at 07:11

## 2021-07-20 RX ADMIN — PANTOPRAZOLE SODIUM 40 MILLIGRAM(S): 20 TABLET, DELAYED RELEASE ORAL at 06:28

## 2021-07-20 RX ADMIN — LISINOPRIL 5 MILLIGRAM(S): 2.5 TABLET ORAL at 06:28

## 2021-07-20 RX ADMIN — OXYCODONE HYDROCHLORIDE 5 MILLIGRAM(S): 5 TABLET ORAL at 21:47

## 2021-07-20 RX ADMIN — OXYCODONE HYDROCHLORIDE 5 MILLIGRAM(S): 5 TABLET ORAL at 22:47

## 2021-07-20 RX ADMIN — HYDROMORPHONE HYDROCHLORIDE 1 MILLIGRAM(S): 2 INJECTION INTRAMUSCULAR; INTRAVENOUS; SUBCUTANEOUS at 13:07

## 2021-07-20 RX ADMIN — Medication 125 MICROGRAM(S): at 06:28

## 2021-07-20 RX ADMIN — Medication 100 MILLIGRAM(S): at 13:09

## 2021-07-20 RX ADMIN — OXYCODONE HYDROCHLORIDE 5 MILLIGRAM(S): 5 TABLET ORAL at 06:38

## 2021-07-20 RX ADMIN — APIXABAN 5 MILLIGRAM(S): 2.5 TABLET, FILM COATED ORAL at 10:02

## 2021-07-20 RX ADMIN — OXYCODONE HYDROCHLORIDE 5 MILLIGRAM(S): 5 TABLET ORAL at 14:29

## 2021-07-20 RX ADMIN — HYDROMORPHONE HYDROCHLORIDE 1 MILLIGRAM(S): 2 INJECTION INTRAMUSCULAR; INTRAVENOUS; SUBCUTANEOUS at 12:08

## 2021-07-20 RX ADMIN — OXYCODONE HYDROCHLORIDE 5 MILLIGRAM(S): 5 TABLET ORAL at 19:30

## 2021-07-20 RX ADMIN — OXYCODONE HYDROCHLORIDE 5 MILLIGRAM(S): 5 TABLET ORAL at 18:39

## 2021-07-20 RX ADMIN — Medication 650 MILLIGRAM(S): at 21:19

## 2021-07-20 RX ADMIN — APIXABAN 5 MILLIGRAM(S): 2.5 TABLET, FILM COATED ORAL at 21:47

## 2021-07-20 RX ADMIN — Medication 100 MILLIGRAM(S): at 06:29

## 2021-07-20 RX ADMIN — Medication 650 MILLIGRAM(S): at 20:19

## 2021-07-20 RX ADMIN — Medication 100 MILLIGRAM(S): at 21:48

## 2021-07-20 RX ADMIN — OXYCODONE HYDROCHLORIDE 5 MILLIGRAM(S): 5 TABLET ORAL at 15:15

## 2021-07-20 RX ADMIN — Medication 100 MILLIGRAM(S): at 11:29

## 2021-07-20 NOTE — PROGRESS NOTE ADULT - SUBJECTIVE AND OBJECTIVE BOX
MANJEET YEBOAH is a 61yFemale , patient examined and chart reviewed.     INTERVAL HPI/ OVERNIGHT EVENTS:   C/o severe right leg pain earlier now feels better.  Afebrile.    PAST MEDICAL & SURGICAL HISTORY:  Pneumonia due to COVID-19 virus  3/2021  inpatient at [facility]  Type 2 diabetes mellitus  Hypothyroid  Acid reflux  Venous insufficiency of leg  History of varicose veins  History of fibromyalgia  Osteoarthritis  Uterine hemorrhage  Severe obesity (BMI &gt;= 40)  H/O:   H/O: hysterectomy  S/P FESS (functional endoscopic sinus surgery)  2007  History of myringotomy  2006  Varicose vein of leg  right leg, excised 2021 thigh to knee  History of total knee replacement  bilateral 2005  History of revision of total replacement of knee joint  right 21      For details regarding the patient's social history, family history, and other miscellaneous elements, please refer the initial infectious diseases consultation and/or the admitting history and physical examination for this admission.    ROS:  CONSTITUTIONAL:  Negative fever or chills  EYES:  Negative  blurry vision or double vision  CARDIOVASCULAR:  Negative for chest pain or palpitations  RESPIRATORY:  Negative for cough, wheezing, or SOB   GASTROINTESTINAL:  Negative for nausea, vomiting, diarrhea, constipation, or abdominal pain  GENITOURINARY:  Negative frequency, urgency or dysuria  NEUROLOGIC:  No headache, confusion, dizziness, lightheadedness  All other systems were reviewed and are negative     ALLERGIES  Augmentin (Vomiting)      Current inpatient medications :    ANTIBIOTICS/RELEVANT:  ceFAZolin   IVPB 2000 milliGRAM(s) IV Intermittent every 8 hours      acetaminophen   Tablet .. 650 milliGRAM(s) Oral every 6 hours PRN  acetaminophen   Tablet .. 650 milliGRAM(s) Oral every 6 hours PRN  aluminum hydroxide/magnesium hydroxide/simethicone Suspension 30 milliLiter(s) Oral every 4 hours PRN  apixaban 5 milliGRAM(s) Oral every 12 hours  atorvastatin 40 milliGRAM(s) Oral at bedtime  FLUoxetine 40 milliGRAM(s) Oral daily  glucagon  Injectable 1 milliGRAM(s) IntraMuscular once  HYDROmorphone   Tablet 1 milliGRAM(s) Oral every 8 hours PRN  levothyroxine 125 MICROGram(s) Oral daily  lisinopril 5 milliGRAM(s) Oral daily  melatonin 3 milliGRAM(s) Oral at bedtime PRN  ondansetron Injectable 4 milliGRAM(s) IV Push every 8 hours PRN  oxyCODONE    IR 5 milliGRAM(s) Oral every 4 hours PRN  pantoprazole    Tablet 40 milliGRAM(s) Oral before breakfast  pregabalin 100 milliGRAM(s) Oral daily  pregabalin 50 milliGRAM(s) Oral <User Schedule>  senna 2 Tablet(s) Oral at bedtime      Objective:     @ 07:01  -   @ 07:00  --------------------------------------------------------  IN: 750 mL / OUT: 0 mL / NET: 750 mL      T(C): 36.9 (21 @ 17:39), Max: 37.4 (21 @ 22:20)  HR: 70 (21 @ 17:39) (66 - 79)  BP: 116/70 (21 @ 17:39) (108/52 - 127/66)  RR: 18 (21 @ 17:39) (18 - 18)  SpO2: 95% (21 @ 17:39) (95% - 97%)  Wt(kg): --      Physical Exam:  General: Obese no acute distress  Neck: supple, trachea midline  Lungs: clear, no wheeze/rhonchi  Cardiovascular: regular rate and rhythm, S1 S2  Abdomen: soft, nontender,  bowel sounds normal  Neurological: alert and oriented x3  Skin: no rash  Extremities: Right leg erythema and tenderness - erythema slightly improved today  right knee surgical site c/d/i          LABS:                          10.1   11.81 )-----------( 331      ( 2021 07:38 )             32.5       07    140  |  102  |  13  ----------------------------<  124<H>  4.1   |  32<H>  |  0.90    Ca    8.7      2021 07:38    TPro  6.5  /  Alb  3.2<L>  /  TBili  0.5  /  DBili  x   /  AST  24  /  ALT  27  /  AlkPhos  119          MICROBIOLOGY:    Culture - Blood (collected 2021 15:03)  Source: .Blood Blood  Preliminary Report (2021 16:01):    No growth to date.    Culture - Blood (collected 2021 15:03)  Source: .Blood Blood  Preliminary Report (2021 16:01):    No growth to date.    Culture - Urine (collected 2021 13:19)  Source: .Urine Clean Catch (Midstream)  Final Report (2021 11:43):    <10,000 CFU/mL Normal Urogenital Angelita    Culture - Blood (collected 2021 13:12)  Source: .Blood Blood-Peripheral  Preliminary Report (2021 14:01):    No growth to date.    Culture - Blood (collected 2021 13:12)  Source: .Blood Blood-Peripheral  Preliminary Report (2021 14:01):    No growth to date.      RADIOLOGY & ADDITIONAL STUDIES:  EXAM:  US DPLX LWR EXT VEINS LTD RT                                  PROCEDURE DATE:  2021          INTERPRETATION:  CLINICAL INFORMATION: Right lower extremity pain.    COMPARISON: None available.    TECHNIQUE: Duplex sonography of the RIGHTLOWER extremity veins with color and spectral Doppler, with and without compression.    FINDINGS:    There is normal compressibility of the right common femoral, femoral and popliteal veins.  The contralateral common femoral vein is patent.  Doppler examination shows normal spontaneous and phasic flow.    No calf vein thrombosis is detected, although the peroneal vein was not visualized.    There is subcutaneous edema in the calf.    IMPRESSION:  No evidence of right lower extremity deep venous thrombosis.  Suboptimal evaluation of the calf veins.    Assessment :   62 y/o F with PMH of DM type 2, Dyslipidemia, Hypothyroidism, COVID PNA 4 months ago, Chronic Venous Insufficiency s/p right leg phlebectomy in April, Fibromyalgia, GERD, OA, and Morbid Obesity recent R TKA on 2021 by Dr Low admitted with worsening RLE cellulitis with lymphangitis  Likely Strep    Plan :   Cont Ancef  Fu cultures ( NGTD )  Trend temps and cbc  Elevate leg      Continue with present regiment.  Appropriate use of antibiotics and adverse effects reviewed.      I have discussed the above plan of care with patient in detail. She expressed understanding of the  treatment plan . Risks, benefits and alternatives discussed in detail. I have asked if she has any questions or concerns and appropriately addressed them to the best of my ability .    > 35 minutes were spent in direct patient care reviewing notes, medications ,labs data/ imaging , discussion with multidisciplinary team.    Thank you for allowing me to participate in care of your patient .    Berenice Jones MD  Infectious Disease  [phone]

## 2021-07-20 NOTE — PROGRESS NOTE ADULT - ASSESSMENT
This is a 62 y/o F with PMH of DM type 2, Dyslipidemia, Hypothyroidism, COVID PNA 4 months ago, Chronic Venous Insufficiency s/p right leg phlebectomy in April, Fibromyalgia, GERD, OA, and Morbid Obesity who presented for RLE cellulitis.     RLE cellulitis:   On Ancef.   -trend WBC count and monitor for fevers.   -f/u culture results  ID Dr Robert barnett     R TKA: performed on July 12, 2021  -surgical site does not appear to be infected.   -orthopedics consulted Dr Low.   -continue Eliquis 5mg q12 h [dose was confirmed with patient and increased dose was chosen due to patients increased risk for VTE]    DM type 2: goal glucose 140-180 while inpatient  -correctional insulin ordered  -diabetic diet    Anemia:   no signs or symptoms of active bleeding. Continue to monitor hgb.     Depression and Fibromyalgia:   continue fluoxetine    Hypothyroidism:   continue synthroid.     HLD:   continue Lipitor    HTN:   continue Lisinopril    GERD:   continue Protonix    DVT ppx: continue Eliquis

## 2021-07-20 NOTE — PROGRESS NOTE ADULT - SUBJECTIVE AND OBJECTIVE BOX
Patient is a 61y old  Female who presents with a chief complaint of RLE cellulitis (2021 15:01)    HPI:  This is a 60 y/o F with PMH of DM type 2, Dyslipidemia, Hypothyroidism, COVID PNA 4 months ago, Chronic Venous Insufficiency s/p right leg phlebectomy in April, Fibromyalgia, GERD, OA, and Morbid Obesity who presented for RLE cellulitis.   Patient states that she began to notice RLE erythema and pain since this past Thursday. She states the erythema has been getting worse, she initially thought she got bit by an insect on her RLE, she presented to the ER on Saturday and was discharged on PO Keflex. She has been compliant with her oral antibiotics but the erythema has gotten worse. She also has a throbbing pain on the R anterior shin. She is compliant with her Eliquis dosing.  She has no other complaints.   She has not noted any drainage from her R TKA incision site. She had a R TKA on 2021.   RLE Duplex shows No evidence of right lower extremity deep venous thrombosis. Suboptimal evaluation of the calf veins.     (2021 13:20)      PAST MEDICAL & SURGICAL HISTORY:  Pneumonia due to COVID-19 virus  3/2021  inpatient at [facility]    Type 2 diabetes mellitus    Hypothyroid    Acid reflux    Venous insufficiency of leg    History of varicose veins    History of fibromyalgia    Osteoarthritis    Uterine hemorrhage    Severe obesity (BMI &gt;= 40)    H/O:     H/O: hysterectomy    S/P FESS (functional endoscopic sinus surgery)  2007    History of myringotomy  2006    Varicose vein of leg  right leg, excised 2021 thigh to knee    History of total knee replacement  bilateral     History of revision of total replacement of knee joint  right 21      MEDICATIONS  (STANDING):  apixaban 5 milliGRAM(s) Oral every 12 hours  atorvastatin 40 milliGRAM(s) Oral at bedtime  ceFAZolin   IVPB 2000 milliGRAM(s) IV Intermittent every 8 hours  FLUoxetine 40 milliGRAM(s) Oral daily  glucagon  Injectable 1 milliGRAM(s) IntraMuscular once  levothyroxine 125 MICROGram(s) Oral daily  lisinopril 5 milliGRAM(s) Oral daily  pantoprazole    Tablet 40 milliGRAM(s) Oral before breakfast  pregabalin 100 milliGRAM(s) Oral daily  pregabalin 50 milliGRAM(s) Oral <User Schedule>  senna 2 Tablet(s) Oral at bedtime    MEDICATIONS  (PRN):  acetaminophen   Tablet .. 650 milliGRAM(s) Oral every 6 hours PRN Mild Pain (1 - 3)  acetaminophen   Tablet .. 650 milliGRAM(s) Oral every 6 hours PRN Temp greater or equal to 38.5C (101.3F), Mild Pain (1 - 3)  aluminum hydroxide/magnesium hydroxide/simethicone Suspension 30 milliLiter(s) Oral every 4 hours PRN Dyspepsia  HYDROmorphone   Tablet 1 milliGRAM(s) Oral every 8 hours PRN Severe Pain (7 - 10)  melatonin 3 milliGRAM(s) Oral at bedtime PRN Insomnia  ondansetron Injectable 4 milliGRAM(s) IV Push every 8 hours PRN Nausea and/or Vomiting  oxyCODONE    IR 5 milliGRAM(s) Oral every 4 hours PRN Moderate Pain (4 - 6)      EXAM:  Vital Signs Last 24 Hrs  T(C): 37 (2021 10:26), Max: 37.4 (2021 22:20)  T(F): 98.6 (2021 10:26), Max: 99.3 (2021 22:20)  HR: 79 (2021 10:26) (66 - 79)  BP: 118/55 (2021 10:26) (118/55 - 153/73)  BP(mean): --  RR: 18 (2021 10:26) (17 - 18)  SpO2: 97% (2021 10:26) (95% - 99%)     @ 07:01  -   @ 07:00  --------------------------------------------------------  IN: 750 mL / OUT: 0 mL / NET: 750 mL        PHYSICAL EXAM:  Constitutional: awake  Neck: supple  Respiratory: bilaterally clear to auscultation, no wheezing, no rhonchi, no crackles, no decreased air entry  Cardiovascular: s1s2, rrr, no murmurs.   Gastrointestinal: soft, non tender, +bowel sounds, no rebound, no guarding.   Extremities: no edema.   Neurological: alert and oriented to person, date and place.   Skin: intact no rash, warm to touch.   Musculoskeletal: moves all 4 extremities  Psychiatric: appropriate affect.     LABS:      LIVER FUNCTIONS - ( 2021 12:10 )  Alb: 3.2 g/dL / Pro: 6.5 g/dL / ALK PHOS: 119 U/L / ALT: 27 U/L DA / AST: 24 U/L / GGT: x                                 10.1   11.81 )-----------( 331      ( 2021 07:38 )             32.5     07-20    140  |  102  |  13  ----------------------------<  124<H>  4.1   |  32<H>  |  0.90    Ca    8.7      2021 07:38    TPro  6.5  /  Alb  3.2<L>  /  TBili  0.5  /  DBili  x   /  AST  24  /  ALT  27  /  AlkPhos  119  19               Patient is a 61y old  Female who presents with a chief complaint of RLE cellulitis (2021 15:01)    HPI:  This is a 60 y/o F with PMH of DM type 2, Dyslipidemia, Hypothyroidism, COVID PNA 4 months ago, Chronic Venous Insufficiency s/p right leg phlebectomy in April, Fibromyalgia, GERD, OA, and Morbid Obesity who presented for RLE cellulitis.   Patient states that she began to notice RLE erythema and pain since this past Thursday. She states the erythema has been getting worse, she initially thought she got bit by an insect on her RLE, she presented to the ER on Saturday and was discharged on PO Keflex. She has been compliant with her oral antibiotics but the erythema has gotten worse. She also has a throbbing pain on the R anterior shin. She is compliant with her Eliquis dosing.  She has no other complaints.   She has not noted any drainage from her R TKA incision site. She had a R TKA on 2021.   RLE Duplex shows No evidence of right lower extremity deep venous thrombosis. Suboptimal evaluation of the calf veins.     (2021 13:20)    Today: Pt notes pain is uncontrolled to the right anterior tibial cellulitis site.   No fevers.     PAST MEDICAL & SURGICAL HISTORY:  Pneumonia due to COVID-19 virus  3/2021  inpatient at [facility]    Type 2 diabetes mellitus    Hypothyroid    Acid reflux    Venous insufficiency of leg    History of varicose veins    History of fibromyalgia    Osteoarthritis    Uterine hemorrhage    Severe obesity (BMI &gt;= 40)    H/O:     H/O: hysterectomy    S/P FESS (functional endoscopic sinus surgery)  2007    History of myringotomy  2006    Varicose vein of leg  right leg, excised 2021 thigh to knee    History of total knee replacement  bilateral 2005    History of revision of total replacement of knee joint  right 21      MEDICATIONS  (STANDING):  apixaban 5 milliGRAM(s) Oral every 12 hours  atorvastatin 40 milliGRAM(s) Oral at bedtime  ceFAZolin   IVPB 2000 milliGRAM(s) IV Intermittent every 8 hours  FLUoxetine 40 milliGRAM(s) Oral daily  glucagon  Injectable 1 milliGRAM(s) IntraMuscular once  levothyroxine 125 MICROGram(s) Oral daily  lisinopril 5 milliGRAM(s) Oral daily  pantoprazole    Tablet 40 milliGRAM(s) Oral before breakfast  pregabalin 100 milliGRAM(s) Oral daily  pregabalin 50 milliGRAM(s) Oral <User Schedule>  senna 2 Tablet(s) Oral at bedtime    MEDICATIONS  (PRN):  acetaminophen   Tablet .. 650 milliGRAM(s) Oral every 6 hours PRN Mild Pain (1 - 3)  acetaminophen   Tablet .. 650 milliGRAM(s) Oral every 6 hours PRN Temp greater or equal to 38.5C (101.3F), Mild Pain (1 - 3)  aluminum hydroxide/magnesium hydroxide/simethicone Suspension 30 milliLiter(s) Oral every 4 hours PRN Dyspepsia  HYDROmorphone   Tablet 1 milliGRAM(s) Oral every 8 hours PRN Severe Pain (7 - 10)  melatonin 3 milliGRAM(s) Oral at bedtime PRN Insomnia  ondansetron Injectable 4 milliGRAM(s) IV Push every 8 hours PRN Nausea and/or Vomiting  oxyCODONE    IR 5 milliGRAM(s) Oral every 4 hours PRN Moderate Pain (4 - 6)    EXAM:  Vital Signs Last 24 Hrs  T(C): 37 (2021 10:26), Max: 37.4 (2021 22:20)  T(F): 98.6 (2021 10:26), Max: 99.3 (2021 22:20)  HR: 79 (2021 10:26) (66 - 79)  BP: 118/55 (2021 10:26) (118/55 - 153/73)  BP(mean): --  RR: 18 (2021 10:26) (17 - 18)  SpO2: 97% (2021 10:26) (95% - 99%)     @ 07:01  -   @ 07:00  --------------------------------------------------------  IN: 750 mL / OUT: 0 mL / NET: 750 mL    PHYSICAL EXAM:  Constitutional: awake  Neck: supple  Neurological: alert and oriented to person, date and place.   Skin: warm to touch.   Musculoskeletal: RLE, +mid line surgical incision to the right knee which is clean/dry and intact. Lower anterior tibial region with area of erythema. +Edema.   Psychiatric: appropriate affect.     LABS:  LIVER FUNCTIONS - ( 2021 12:10 )  Alb: 3.2 g/dL / Pro: 6.5 g/dL / ALK PHOS: 119 U/L / ALT: 27 U/L DA / AST: 24 U/L / GGT: x                                 10.1   11.81 )-----------( 331      ( 2021 07:38 )             32.5     07-20    140  |  102  |  13  ----------------------------<  124<H>  4.1   |  32<H>  |  0.90    Ca    8.7      2021 07:38    TPro  6.5  /  Alb  3.2<L>  /  TBili  0.5  /  DBili  x   /  AST  24  /  ALT  27  /  AlkPhos  119  07-19

## 2021-07-20 NOTE — DISCHARGE NOTE NURSING/CASE MANAGEMENT/SOCIAL WORK - PATIENT PORTAL LINK FT
You can access the FollowMyHealth Patient Portal offered by [facility] by registering at the following website: [email or web address]. By joining [facility]’s FollowMyHealth portal, you will also be able to view your health information using other applications (apps) compatible with our system.

## 2021-07-21 PROCEDURE — 99232 SBSQ HOSP IP/OBS MODERATE 35: CPT

## 2021-07-21 RX ADMIN — Medication 125 MICROGRAM(S): at 06:04

## 2021-07-21 RX ADMIN — OXYCODONE HYDROCHLORIDE 5 MILLIGRAM(S): 5 TABLET ORAL at 04:44

## 2021-07-21 RX ADMIN — PANTOPRAZOLE SODIUM 40 MILLIGRAM(S): 20 TABLET, DELAYED RELEASE ORAL at 06:04

## 2021-07-21 RX ADMIN — OXYCODONE HYDROCHLORIDE 5 MILLIGRAM(S): 5 TABLET ORAL at 12:40

## 2021-07-21 RX ADMIN — Medication 650 MILLIGRAM(S): at 21:45

## 2021-07-21 RX ADMIN — Medication 100 MILLIGRAM(S): at 21:21

## 2021-07-21 RX ADMIN — Medication 100 MILLIGRAM(S): at 06:04

## 2021-07-21 RX ADMIN — OXYCODONE HYDROCHLORIDE 5 MILLIGRAM(S): 5 TABLET ORAL at 08:24

## 2021-07-21 RX ADMIN — Medication 40 MILLIGRAM(S): at 11:10

## 2021-07-21 RX ADMIN — ATORVASTATIN CALCIUM 40 MILLIGRAM(S): 80 TABLET, FILM COATED ORAL at 21:21

## 2021-07-21 RX ADMIN — SENNA PLUS 2 TABLET(S): 8.6 TABLET ORAL at 21:21

## 2021-07-21 RX ADMIN — Medication 650 MILLIGRAM(S): at 21:21

## 2021-07-21 RX ADMIN — Medication 100 MILLIGRAM(S): at 11:10

## 2021-07-21 RX ADMIN — OXYCODONE HYDROCHLORIDE 5 MILLIGRAM(S): 5 TABLET ORAL at 17:53

## 2021-07-21 RX ADMIN — LISINOPRIL 5 MILLIGRAM(S): 2.5 TABLET ORAL at 06:04

## 2021-07-21 RX ADMIN — OXYCODONE HYDROCHLORIDE 5 MILLIGRAM(S): 5 TABLET ORAL at 09:05

## 2021-07-21 RX ADMIN — Medication 50 MILLIGRAM(S): at 17:08

## 2021-07-21 RX ADMIN — Medication 100 MILLIGRAM(S): at 13:09

## 2021-07-21 RX ADMIN — OXYCODONE HYDROCHLORIDE 5 MILLIGRAM(S): 5 TABLET ORAL at 23:13

## 2021-07-21 RX ADMIN — OXYCODONE HYDROCHLORIDE 5 MILLIGRAM(S): 5 TABLET ORAL at 17:08

## 2021-07-21 RX ADMIN — Medication 650 MILLIGRAM(S): at 14:27

## 2021-07-21 RX ADMIN — Medication 650 MILLIGRAM(S): at 15:13

## 2021-07-21 RX ADMIN — APIXABAN 5 MILLIGRAM(S): 2.5 TABLET, FILM COATED ORAL at 21:21

## 2021-07-21 RX ADMIN — OXYCODONE HYDROCHLORIDE 5 MILLIGRAM(S): 5 TABLET ORAL at 04:02

## 2021-07-21 RX ADMIN — Medication 650 MILLIGRAM(S): at 08:24

## 2021-07-21 RX ADMIN — OXYCODONE HYDROCHLORIDE 5 MILLIGRAM(S): 5 TABLET ORAL at 13:25

## 2021-07-21 RX ADMIN — OXYCODONE HYDROCHLORIDE 5 MILLIGRAM(S): 5 TABLET ORAL at 23:53

## 2021-07-21 RX ADMIN — Medication 650 MILLIGRAM(S): at 09:05

## 2021-07-21 RX ADMIN — APIXABAN 5 MILLIGRAM(S): 2.5 TABLET, FILM COATED ORAL at 08:24

## 2021-07-21 NOTE — PROGRESS NOTE ADULT - SUBJECTIVE AND OBJECTIVE BOX
Right leg cellulitis HD#3    No new complaints, minimal discomfort of right lower extremity.  No SOB, CP, N/V.     Right leg distally reddened, warm and mild edema.  Foot moderately swollen.  Sensate to light touch, +capillary refill, + DP.                      10.1   11.81 )-----------( 331      ( 20 Jul 2021 07:38 )             32.5       T(C): 37.1 (07-21-21 @ 10:00), Max: 37.1 (07-21-21 @ 10:00)  HR: 78 (07-21-21 @ 10:00) (66 - 78)  BP: 131/59 (07-21-21 @ 10:00) (104/50 - 144/70)  RR: 17 (07-21-21 @ 10:00) (17 - 18)  SpO2: 95% (07-21-21 @ 10:00) (95% - 99%)  Wt(kg): --      07-20 @ 07:01  -  07-21 @ 07:00  --------------------------------------------------------  IN: 400 mL / OUT: 0 mL / NET: 400 mL    A+P:  Right lower extremity cellulitis, not associated with recent TKR.  D/W Dr. Jones from ID.  Plan to continue Ancef for several days and monitor S/S.  Eliquis for VTE prophylaxis.  Prineo knee dressing kept intact for now to minimize exposure.

## 2021-07-21 NOTE — PROGRESS NOTE ADULT - SUBJECTIVE AND OBJECTIVE BOX
Patient is a 61y old  Female who presents with a chief complaint of RLE cellulitis (2021 17:42)      HPI:  This is a 62 y/o F with PMH of DM type 2, Dyslipidemia, Hypothyroidism, COVID PNA 4 months ago, Chronic Venous Insufficiency s/p right leg phlebectomy in April, Fibromyalgia, GERD, OA, and Morbid Obesity who presented for RLE cellulitis.   Patient states that she began to notice RLE erythema and pain since this past Thursday. She states the erythema has been getting worse, she initially thought she got bit by an insect on her RLE, she presented to the ER on Saturday and was discharged on PO Keflex. She has been compliant with her oral antibiotics but the erythema has gotten worse. She also has a throbbing pain on the R anterior shin. She is compliant with her Eliquis dosing.  She has no other complaints.   She has not noted any drainage from her R TKA incision site. She had a R TKA on 2021.   RLE Duplex shows No evidence of right lower extremity deep venous thrombosis. Suboptimal evaluation of the calf veins.     (2021 13:20)    [id number]    ROS:    PAST MEDICAL & SURGICAL HISTORY:  Pneumonia due to COVID-19 virus  3/2021  inpatient at [facility]    Type 2 diabetes mellitus    Hypothyroid    Acid reflux    Venous insufficiency of leg    History of varicose veins    History of fibromyalgia    Osteoarthritis    Uterine hemorrhage    Severe obesity (BMI &gt;= 40)    H/O:     H/O: hysterectomy    S/P FESS (functional endoscopic sinus surgery)  2007    History of myringotomy  2006    Varicose vein of leg  right leg, excised 2021 thigh to knee    History of total knee replacement  bilateral     History of revision of total replacement of knee joint  right 21      MEDICATIONS  (STANDING):  apixaban 5 milliGRAM(s) Oral every 12 hours  atorvastatin 40 milliGRAM(s) Oral at bedtime  ceFAZolin   IVPB 2000 milliGRAM(s) IV Intermittent every 8 hours  FLUoxetine 40 milliGRAM(s) Oral daily  glucagon  Injectable 1 milliGRAM(s) IntraMuscular once  levothyroxine 125 MICROGram(s) Oral daily  lisinopril 5 milliGRAM(s) Oral daily  pantoprazole    Tablet 40 milliGRAM(s) Oral before breakfast  pregabalin 100 milliGRAM(s) Oral daily  pregabalin 50 milliGRAM(s) Oral <User Schedule>  senna 2 Tablet(s) Oral at bedtime    MEDICATIONS  (PRN):  acetaminophen   Tablet .. 650 milliGRAM(s) Oral every 6 hours PRN Mild Pain (1 - 3)  acetaminophen   Tablet .. 650 milliGRAM(s) Oral every 6 hours PRN Temp greater or equal to 38.5C (101.3F), Mild Pain (1 - 3)  aluminum hydroxide/magnesium hydroxide/simethicone Suspension 30 milliLiter(s) Oral every 4 hours PRN Dyspepsia  HYDROmorphone   Tablet 1 milliGRAM(s) Oral every 8 hours PRN Severe Pain (7 - 10)  melatonin 3 milliGRAM(s) Oral at bedtime PRN Insomnia  ondansetron Injectable 4 milliGRAM(s) IV Push every 8 hours PRN Nausea and/or Vomiting  oxyCODONE    IR 5 milliGRAM(s) Oral every 4 hours PRN Moderate Pain (4 - 6)      EXAM:  Vital Signs Last 24 Hrs  T(C): 37.1 (2021 10:00), Max: 37.1 (2021 10:00)  T(F): 98.7 (2021 10:00), Max: 98.7 (2021 10:00)  HR: 78 (2021 10:00) (66 - 78)  BP: 131/59 (2021 10:00) (104/50 - 144/70)  BP(mean): --  RR: 17 (2021 10:00) (17 - 18)  SpO2: 95% (2021 10:00) (95% - 99%)    -20 @ 07:01  -  07- @ 07:00  --------------------------------------------------------  IN: 400 mL / OUT: 0 mL / NET: 400 mL        PHYSICAL EXAM:  Constitutional: awake  Neck: supple   Extremities: no edema.   Neurological: alert and oriented to person, date and place.   Skin: intact no rash, warm to touch.   Musculoskeletal: moves all 4 extremities  Psychiatric: appropriate affect.     LABS:      LIVER FUNCTIONS - ( 2021 12:10 )  Alb: 3.2 g/dL / Pro: 6.5 g/dL / ALK PHOS: 119 U/L / ALT: 27 U/L DA / AST: 24 U/L / GGT: x                                 10.1   11.81 )-----------( 331      ( 2021 07:38 )             32.5     07-20    140  |  102  |  13  ----------------------------<  124<H>  4.1   |  32<H>  |  0.90    Ca    8.7      2021 07:38    TPro  6.5  /  Alb  3.2<L>  /  TBili  0.5  /  DBili  x   /  AST  24  /  ALT  27  /  AlkPhos  119  07-19               Patient is a 61y old  Female who presents with a chief complaint of RLE cellulitis (2021 17:42)      HPI:  This is a 60 y/o F with PMH of DM type 2, Dyslipidemia, Hypothyroidism, COVID PNA 4 months ago, Chronic Venous Insufficiency s/p right leg phlebectomy in April, Fibromyalgia, GERD, OA, and Morbid Obesity who presented for RLE cellulitis.   Patient states that she began to notice RLE erythema and pain since this past Thursday. She states the erythema has been getting worse, she initially thought she got bit by an insect on her RLE, she presented to the ER on Saturday and was discharged on PO Keflex. She has been compliant with her oral antibiotics but the erythema has gotten worse. She also has a throbbing pain on the R anterior shin. She is compliant with her Eliquis dosing.  She has no other complaints.   She has not noted any drainage from her R TKA incision site. She had a R TKA on 2021.   RLE Duplex shows No evidence of right lower extremity deep venous thrombosis. Suboptimal evaluation of the calf veins.     (2021 13:20)    Today: Pt notes that she had pain yesterday. Today her pain is better controlled. Pt is requesting a regular diet. States that she is not diabetic and that she is on Metformin for hormonal issues for which she was followed by a endocrinologist for.     PAST MEDICAL & SURGICAL HISTORY:  Pneumonia due to COVID-19 virus  3/2021  inpatient at [facility]    Type 2 diabetes mellitus    Hypothyroid    Acid reflux    Venous insufficiency of leg    History of varicose veins    History of fibromyalgia    Osteoarthritis    Uterine hemorrhage    Severe obesity (BMI &gt;= 40)    H/O:     H/O: hysterectomy    S/P FESS (functional endoscopic sinus surgery)  2007    History of myringotomy  2006    Varicose vein of leg  right leg, excised 2021 thigh to knee    History of total knee replacement  bilateral 2005    History of revision of total replacement of knee joint  right 21      MEDICATIONS  (STANDING):  apixaban 5 milliGRAM(s) Oral every 12 hours  atorvastatin 40 milliGRAM(s) Oral at bedtime  ceFAZolin   IVPB 2000 milliGRAM(s) IV Intermittent every 8 hours  FLUoxetine 40 milliGRAM(s) Oral daily  glucagon  Injectable 1 milliGRAM(s) IntraMuscular once  levothyroxine 125 MICROGram(s) Oral daily  lisinopril 5 milliGRAM(s) Oral daily  pantoprazole    Tablet 40 milliGRAM(s) Oral before breakfast  pregabalin 100 milliGRAM(s) Oral daily  pregabalin 50 milliGRAM(s) Oral <User Schedule>  senna 2 Tablet(s) Oral at bedtime    MEDICATIONS  (PRN):  acetaminophen   Tablet .. 650 milliGRAM(s) Oral every 6 hours PRN Mild Pain (1 - 3)  acetaminophen   Tablet .. 650 milliGRAM(s) Oral every 6 hours PRN Temp greater or equal to 38.5C (101.3F), Mild Pain (1 - 3)  aluminum hydroxide/magnesium hydroxide/simethicone Suspension 30 milliLiter(s) Oral every 4 hours PRN Dyspepsia  HYDROmorphone   Tablet 1 milliGRAM(s) Oral every 8 hours PRN Severe Pain (7 - 10)  melatonin 3 milliGRAM(s) Oral at bedtime PRN Insomnia  ondansetron Injectable 4 milliGRAM(s) IV Push every 8 hours PRN Nausea and/or Vomiting  oxyCODONE    IR 5 milliGRAM(s) Oral every 4 hours PRN Moderate Pain (4 - 6)      EXAM:  Vital Signs Last 24 Hrs  T(C): 37.1 (2021 10:00), Max: 37.1 (2021 10:00)  T(F): 98.7 (2021 10:00), Max: 98.7 (2021 10:00)  HR: 78 (2021 10:00) (66 - 78)  BP: 131/59 (2021 10:00) (104/50 - 144/70)  BP(mean): --  RR: 17 (2021 10:00) (17 - 18)  SpO2: 95% (2021 10:00) (95% - 99%)    07-20 @ 07:01  -  - @ 07:00  --------------------------------------------------------  IN: 400 mL / OUT: 0 mL / NET: 400 mL        PHYSICAL EXAM:  Constitutional: awake  Neck: supple   Extremities: Right lower extremity with right knee mid line incision which is clean and dry. Lower anterior region with darkening of the erythema noted yesterday. Slight decreased in edema. No calf tenderness.   Neurological: alert and oriented to person, date and place.   Skin: intact no rash, warm to touch.   Musculoskeletal: moves all 4 extremities  Psychiatric: appropriate affect.     LABS:      LIVER FUNCTIONS - ( 2021 12:10 )  Alb: 3.2 g/dL / Pro: 6.5 g/dL / ALK PHOS: 119 U/L / ALT: 27 U/L DA / AST: 24 U/L / GGT: x                                 10.1   11.81 )-----------( 331      ( 2021 07:38 )             32.5     07-20    140  |  102  |  13  ----------------------------<  124<H>  4.1   |  32<H>  |  0.90    Ca    8.7      2021 07:38    TPro  6.5  /  Alb  3.2<L>  /  TBili  0.5  /  DBili  x   /  AST  24  /  ALT  27  /  AlkPhos  119  07-19               Patient is a 61y old  Female who presents with a chief complaint of RLE cellulitis (2021 17:42)    HPI:  This is a 62 y/o F with PMH of DM type 2, Dyslipidemia, Hypothyroidism, COVID PNA 4 months ago, Chronic Venous Insufficiency s/p right leg phlebectomy in April, Fibromyalgia, GERD, OA, and Morbid Obesity who presented for RLE cellulitis.   Patient states that she began to notice RLE erythema and pain since this past Thursday. She states the erythema has been getting worse, she initially thought she got bit by an insect on her RLE, she presented to the ER on Saturday and was discharged on PO Keflex. She has been compliant with her oral antibiotics but the erythema has gotten worse. She also has a throbbing pain on the R anterior shin. She is compliant with her Eliquis dosing.  She has no other complaints.   She has not noted any drainage from her R TKA incision site. She had a R TKA on 2021.   RLE Duplex shows No evidence of right lower extremity deep venous thrombosis. Suboptimal evaluation of the calf veins.     (2021 13:20)    Today: Pt notes that she had pain yesterday. Today her pain is better controlled. Pt is requesting a regular diet. States that she is not diabetic and that she is on Metformin for hormonal issues for which she was followed by a endocrinologist for.     PAST MEDICAL & SURGICAL HISTORY:  Pneumonia due to COVID-19 virus  3/2021  inpatient at [facility]    Type 2 diabetes mellitus    Hypothyroid    Acid reflux    Venous insufficiency of leg    History of varicose veins    History of fibromyalgia    Osteoarthritis    Uterine hemorrhage    Severe obesity (BMI &gt;= 40)    H/O:     H/O: hysterectomy    S/P FESS (functional endoscopic sinus surgery)  2007    History of myringotomy  2006    Varicose vein of leg  right leg, excised 2021 thigh to knee    History of total knee replacement  bilateral 2005    History of revision of total replacement of knee joint  right 21      MEDICATIONS  (STANDING):  apixaban 5 milliGRAM(s) Oral every 12 hours  atorvastatin 40 milliGRAM(s) Oral at bedtime  ceFAZolin   IVPB 2000 milliGRAM(s) IV Intermittent every 8 hours  FLUoxetine 40 milliGRAM(s) Oral daily  glucagon  Injectable 1 milliGRAM(s) IntraMuscular once  levothyroxine 125 MICROGram(s) Oral daily  lisinopril 5 milliGRAM(s) Oral daily  pantoprazole    Tablet 40 milliGRAM(s) Oral before breakfast  pregabalin 100 milliGRAM(s) Oral daily  pregabalin 50 milliGRAM(s) Oral <User Schedule>  senna 2 Tablet(s) Oral at bedtime    MEDICATIONS  (PRN):  acetaminophen   Tablet .. 650 milliGRAM(s) Oral every 6 hours PRN Mild Pain (1 - 3)  acetaminophen   Tablet .. 650 milliGRAM(s) Oral every 6 hours PRN Temp greater or equal to 38.5C (101.3F), Mild Pain (1 - 3)  aluminum hydroxide/magnesium hydroxide/simethicone Suspension 30 milliLiter(s) Oral every 4 hours PRN Dyspepsia  HYDROmorphone   Tablet 1 milliGRAM(s) Oral every 8 hours PRN Severe Pain (7 - 10)  melatonin 3 milliGRAM(s) Oral at bedtime PRN Insomnia  ondansetron Injectable 4 milliGRAM(s) IV Push every 8 hours PRN Nausea and/or Vomiting  oxyCODONE    IR 5 milliGRAM(s) Oral every 4 hours PRN Moderate Pain (4 - 6)      EXAM:  Vital Signs Last 24 Hrs  T(C): 37.1 (2021 10:00), Max: 37.1 (2021 10:00)  T(F): 98.7 (2021 10:00), Max: 98.7 (2021 10:00)  HR: 78 (2021 10:00) (66 - 78)  BP: 131/59 (2021 10:00) (104/50 - 144/70)  BP(mean): --  RR: 17 (2021 10:00) (17 - 18)  SpO2: 95% (2021 10:00) (95% - 99%)    07-20 @ 07:01  -  - @ 07:00  --------------------------------------------------------  IN: 400 mL / OUT: 0 mL / NET: 400 mL    PHYSICAL EXAM:  Constitutional: awake  Neck: supple   Extremities: Right lower extremity with right knee mid line incision which is clean and dry. Lower anterior region with darkening of the erythema noted yesterday. Slight decreased in edema. No calf tenderness.   Neurological: alert and oriented to person, date and place.   Skin:warm to touch.   Musculoskeletal: moves all 4 extremities  Psychiatric: appropriate affect.     LABS:      LIVER FUNCTIONS - ( 2021 12:10 )  Alb: 3.2 g/dL / Pro: 6.5 g/dL / ALK PHOS: 119 U/L / ALT: 27 U/L DA / AST: 24 U/L / GGT: x                                 10.1   11.81 )-----------( 331      ( 2021 07:38 )             32.5     07-20    140  |  102  |  13  ----------------------------<  124<H>  4.1   |  32<H>  |  0.90    Ca    8.7      2021 07:38    TPro  6.5  /  Alb  3.2<L>  /  TBili  0.5  /  DBili  x   /  AST  24  /  ALT  27  /  AlkPhos  119  07-19

## 2021-07-21 NOTE — PROGRESS NOTE ADULT - SUBJECTIVE AND OBJECTIVE BOX
MANJEET YEBOAH is a 61yFemale , patient examined and chart reviewed.     INTERVAL HPI/ OVERNIGHT EVENTS:   Feels better. No events.  Afebrile.    PAST MEDICAL & SURGICAL HISTORY:  Pneumonia due to COVID-19 virus  3/2021  inpatient at [facility]  Type 2 diabetes mellitus  Hypothyroid  Acid reflux  Venous insufficiency of leg  History of varicose veins  History of fibromyalgia  Osteoarthritis  Uterine hemorrhage  Severe obesity (BMI &gt;= 40)  H/O:   H/O: hysterectomy  S/P FESS (functional endoscopic sinus surgery)  2007  History of myringotomy  2006  Varicose vein of leg  right leg, excised 2021 thigh to knee  History of total knee replacement  bilateral 2005  History of revision of total replacement of knee joint  right 21      For details regarding the patient's social history, family history, and other miscellaneous elements, please refer the initial infectious diseases consultation and/or the admitting history and physical examination for this admission.    ROS:  CONSTITUTIONAL:  Negative fever or chills  EYES:  Negative  blurry vision or double vision  CARDIOVASCULAR:  Negative for chest pain or palpitations  RESPIRATORY:  Negative for cough, wheezing, or SOB   GASTROINTESTINAL:  Negative for nausea, vomiting, diarrhea, constipation, or abdominal pain  GENITOURINARY:  Negative frequency, urgency or dysuria  NEUROLOGIC:  No headache, confusion, dizziness, lightheadedness  All other systems were reviewed and are negative     ALLERGIES  Augmentin (Vomiting)      Current inpatient medications :    ANTIBIOTICS/RELEVANT:  ceFAZolin   IVPB 2000 milliGRAM(s) IV Intermittent every 8 hours    MEDICATIONS  (STANDING):  apixaban 5 milliGRAM(s) Oral every 12 hours  atorvastatin 40 milliGRAM(s) Oral at bedtime  FLUoxetine 40 milliGRAM(s) Oral daily  glucagon  Injectable 1 milliGRAM(s) IntraMuscular once  levothyroxine 125 MICROGram(s) Oral daily  lisinopril 5 milliGRAM(s) Oral daily  pantoprazole    Tablet 40 milliGRAM(s) Oral before breakfast  pregabalin 100 milliGRAM(s) Oral daily  pregabalin 50 milliGRAM(s) Oral <User Schedule>  senna 2 Tablet(s) Oral at bedtime    MEDICATIONS  (PRN):  acetaminophen   Tablet .. 650 milliGRAM(s) Oral every 6 hours PRN Mild Pain (1 - 3)  acetaminophen   Tablet .. 650 milliGRAM(s) Oral every 6 hours PRN Temp greater or equal to 38.5C (101.3F), Mild Pain (1 - 3)  aluminum hydroxide/magnesium hydroxide/simethicone Suspension 30 milliLiter(s) Oral every 4 hours PRN Dyspepsia  HYDROmorphone   Tablet 1 milliGRAM(s) Oral every 8 hours PRN Severe Pain (7 - 10)  melatonin 3 milliGRAM(s) Oral at bedtime PRN Insomnia  ondansetron Injectable 4 milliGRAM(s) IV Push every 8 hours PRN Nausea and/or Vomiting  oxyCODONE    IR 5 milliGRAM(s) Oral every 4 hours PRN Moderate Pain (4 - 6)      Objective:  Vital Signs Last 24 Hrs  T(C): 37.1 (2021 10:00), Max: 37.1 (2021 10:00)  T(F): 98.7 (2021 10:00), Max: 98.7 (2021 10:00)  HR: 78 (2021 10:00) (66 - 78)  BP: 131/59 (2021 10:00) (104/50 - 144/70)  RR: 17 (2021 10:00) (17 - 18)  SpO2: 95% (2021 10:00) (95% - 99%)    Physical Exam:  General: Obese no acute distress  Neck: supple, trachea midline  Lungs: clear, no wheeze/rhonchi  Cardiovascular: regular rate and rhythm, S1 S2  Abdomen: soft, nontender,  bowel sounds normal  Neurological: alert and oriented x3  Skin: no rash  Extremities: Right leg erythema improving less tender  right knee surgical site c/d/i          LABS:                        10.1   11.81 )-----------( 331      ( 2021 07:38 )             32.5   07-20    140  |  102  |  13  ----------------------------<  124<H>  4.1   |  32<H>  |  0.90    Ca    8.7      2021 07:38    MICROBIOLOGY:    Culture - Blood (collected 2021 15:03)  Source: .Blood Blood  Preliminary Report (2021 16:01):    No growth to date.    Culture - Blood (collected 2021 15:03)  Source: .Blood Blood  Preliminary Report (2021 16:01):    No growth to date.    Culture - Urine (collected 2021 13:19)  Source: .Urine Clean Catch (Midstream)  Final Report (2021 11:43):    <10,000 CFU/mL Normal Urogenital Angelita    Culture - Blood (collected 2021 13:12)  Source: .Blood Blood-Peripheral  Preliminary Report (2021 14:01):    No growth to date.    Culture - Blood (collected 2021 13:12)  Source: .Blood Blood-Peripheral  Preliminary Report (2021 14:01):    No growth to date.      RADIOLOGY & ADDITIONAL STUDIES:  EXAM:  US DPLX LWR EXT VEINS LTD RT                                  PROCEDURE DATE:  2021          INTERPRETATION:  CLINICAL INFORMATION: Right lower extremity pain.    COMPARISON: None available.    TECHNIQUE: Duplex sonography of the RIGHTLOWER extremity veins with color and spectral Doppler, with and without compression.    FINDINGS:    There is normal compressibility of the right common femoral, femoral and popliteal veins.  The contralateral common femoral vein is patent.  Doppler examination shows normal spontaneous and phasic flow.    No calf vein thrombosis is detected, although the peroneal vein was not visualized.    There is subcutaneous edema in the calf.    IMPRESSION:  No evidence of right lower extremity deep venous thrombosis.  Suboptimal evaluation of the calf veins.    Assessment :   60 y/o F with PMH of DM type 2, Dyslipidemia, Hypothyroidism, COVID PNA 4 months ago, Chronic Venous Insufficiency s/p right leg phlebectomy in April, Fibromyalgia, GERD, OA, and Morbid Obesity recent R TKA on 2021 by Dr Low admitted with worsening RLE cellulitis with lymphangitis  Likely Strep  Improving    Plan :   Cont Ancef  Fu cultures ( NGTD )  Trend temps and cbc  Elevate leg    D/w Ortho      Continue with present regiment.  Appropriate use of antibiotics and adverse effects reviewed.      I have discussed the above plan of care with patient in detail. She expressed understanding of the  treatment plan . Risks, benefits and alternatives discussed in detail. I have asked if she has any questions or concerns and appropriately addressed them to the best of my ability .    > 35 minutes were spent in direct patient care reviewing notes, medications ,labs data/ imaging , discussion with multidisciplinary team.    Thank you for allowing me to participate in care of your patient .    Berenice Jones MD  Infectious Disease  [phone]

## 2021-07-21 NOTE — PROGRESS NOTE ADULT - ASSESSMENT
This is a 60 y/o F with PMH of DM type 2, Dyslipidemia, Hypothyroidism, COVID PNA 4 months ago, Chronic Venous Insufficiency s/p right leg phlebectomy in April, Fibromyalgia, GERD, OA, and Morbid Obesity who presented for RLE cellulitis.     RLE cellulitis:   On Ancef.   trend WBC count and monitor for fevers.   f/u culture results (  ID Dr Jones appreciated     R TKA: performed on July 12, 2021  -surgical site does not appear to be infected.   -orthopedics consulted Dr Low.   -continue Eliquis 5mg q12 h [dose was confirmed with patient and increased dose was chosen due to patients increased risk for VTE]    DM type 2: goal glucose 140-180 while inpatient  -correctional insulin ordered  -diabetic diet    Anemia:   no signs or symptoms of active bleeding. Continue to monitor hgb.     Depression and Fibromyalgia:   continue fluoxetine    Hypothyroidism:   continue synthroid.     HLD:   continue Lipitor    HTN:   continue Lisinopril    GERD:   continue Protonix    DVT ppx: continue Eliquis           This is a 60 y/o F with PMH of DM type 2, Dyslipidemia, Hypothyroidism, COVID PNA 4 months ago, Chronic Venous Insufficiency s/p right leg phlebectomy in April, Fibromyalgia, GERD, OA, and Morbid Obesity who presented for RLE cellulitis.     RLE cellulitis:   On Ancef.   trend WBC count and monitor for fevers.   f/u culture results (  ID Dr Jones appreciated     R TKA: performed on July 12, 2021  -surgical site does not appear to be infected.   -orthopedics consulted Dr Low.   -continue Eliquis 5mg q12 h [dose was confirmed with patient and increased dose was chosen due to patients increased risk for VTE]    -Pre diabetic Hgba1c of 6.4.   Pt states that she takes Metformin for other hormonal issues. Pt does not check her fingersticks at home. Pt used to follow with a Dr Cammy Mera. Pt is requesting a regular diet and does not want to be on a diabetic diet. Pt to follow on outpatient with her PCP.     Anemia:   no signs or symptoms of active bleeding. Continue to monitor hgb.     Depression and Fibromyalgia:   continue fluoxetine    Hypothyroidism:   continue synthroid.     HLD:   continue Lipitor    HTN:   continue Lisinopril    GERD:   continue Protonix    DVT ppx: continue Eliquis           This is a 60 y/o F with PMH of DM type 2, Dyslipidemia, Hypothyroidism, COVID PNA 4 months ago, Chronic Venous Insufficiency s/p right leg phlebectomy in April, Fibromyalgia, GERD, OA, and Morbid Obesity who presented for RLE cellulitis.     RLE cellulitis:   On Ancef.   trend WBC count and monitor for fevers.   f/u culture results   ID Dr Robert barnett     R TKA: performed on July 12, 2021  -surgical site does not appear to be infected.   -orthopedics consulted Dr Low.   -continue Eliquis 5mg q12 h [dose was confirmed with patient and increased dose was chosen due to patients increased risk for VTE]    -Pre diabetic Hgba1c of 6.4.   Pt states that she takes Metformin for other hormonal issues. Pt does not check her fingersticks at home. Pt used to follow with a Dr Cammy Mera. Pt is requesting a regular diet and does not want to be on a diabetic diet. Pt to follow on outpatient with her PCP.     Anemia:   no signs or symptoms of active bleeding. Continue to monitor hgb.     Depression and Fibromyalgia:   continue fluoxetine    Hypothyroidism:   continue synthroid.     HLD:   continue Lipitor    HTN:   continue Lisinopril    GERD:   continue Protonix    DVT ppx: continue Eliquis

## 2021-07-22 LAB
ANION GAP SERPL CALC-SCNC: 6 MMOL/L — SIGNIFICANT CHANGE UP (ref 5–17)
BUN SERPL-MCNC: 13 MG/DL — SIGNIFICANT CHANGE UP (ref 7–23)
CALCIUM SERPL-MCNC: 8.9 MG/DL — SIGNIFICANT CHANGE UP (ref 8.4–10.5)
CHLORIDE SERPL-SCNC: 102 MMOL/L — SIGNIFICANT CHANGE UP (ref 96–108)
CO2 SERPL-SCNC: 30 MMOL/L — SIGNIFICANT CHANGE UP (ref 22–31)
CREAT SERPL-MCNC: 0.87 MG/DL — SIGNIFICANT CHANGE UP (ref 0.5–1.3)
GLUCOSE SERPL-MCNC: 117 MG/DL — HIGH (ref 70–99)
HCT VFR BLD CALC: 35.7 % — SIGNIFICANT CHANGE UP (ref 34.5–45)
HGB BLD-MCNC: 11.2 G/DL — LOW (ref 11.5–15.5)
MCHC RBC-ENTMCNC: 26.9 PG — LOW (ref 27–34)
MCHC RBC-ENTMCNC: 31.4 GM/DL — LOW (ref 32–36)
MCV RBC AUTO: 85.6 FL — SIGNIFICANT CHANGE UP (ref 80–100)
NRBC # BLD: 0 /100 WBCS — SIGNIFICANT CHANGE UP (ref 0–0)
PLATELET # BLD AUTO: 369 K/UL — SIGNIFICANT CHANGE UP (ref 150–400)
POTASSIUM SERPL-MCNC: 4.4 MMOL/L — SIGNIFICANT CHANGE UP (ref 3.5–5.3)
POTASSIUM SERPL-SCNC: 4.4 MMOL/L — SIGNIFICANT CHANGE UP (ref 3.5–5.3)
RBC # BLD: 4.17 M/UL — SIGNIFICANT CHANGE UP (ref 3.8–5.2)
RBC # FLD: 14.6 % — HIGH (ref 10.3–14.5)
SODIUM SERPL-SCNC: 138 MMOL/L — SIGNIFICANT CHANGE UP (ref 135–145)
WBC # BLD: 13.29 K/UL — HIGH (ref 3.8–10.5)
WBC # FLD AUTO: 13.29 K/UL — HIGH (ref 3.8–10.5)

## 2021-07-22 PROCEDURE — 99232 SBSQ HOSP IP/OBS MODERATE 35: CPT

## 2021-07-22 RX ADMIN — APIXABAN 5 MILLIGRAM(S): 2.5 TABLET, FILM COATED ORAL at 21:19

## 2021-07-22 RX ADMIN — Medication 650 MILLIGRAM(S): at 19:00

## 2021-07-22 RX ADMIN — PANTOPRAZOLE SODIUM 40 MILLIGRAM(S): 20 TABLET, DELAYED RELEASE ORAL at 05:46

## 2021-07-22 RX ADMIN — SENNA PLUS 2 TABLET(S): 8.6 TABLET ORAL at 21:19

## 2021-07-22 RX ADMIN — OXYCODONE HYDROCHLORIDE 5 MILLIGRAM(S): 5 TABLET ORAL at 21:19

## 2021-07-22 RX ADMIN — Medication 50 MILLIGRAM(S): at 18:15

## 2021-07-22 RX ADMIN — Medication 100 MILLIGRAM(S): at 11:50

## 2021-07-22 RX ADMIN — Medication 100 MILLIGRAM(S): at 13:34

## 2021-07-22 RX ADMIN — APIXABAN 5 MILLIGRAM(S): 2.5 TABLET, FILM COATED ORAL at 09:07

## 2021-07-22 RX ADMIN — OXYCODONE HYDROCHLORIDE 5 MILLIGRAM(S): 5 TABLET ORAL at 13:47

## 2021-07-22 RX ADMIN — OXYCODONE HYDROCHLORIDE 5 MILLIGRAM(S): 5 TABLET ORAL at 09:00

## 2021-07-22 RX ADMIN — Medication 100 MILLIGRAM(S): at 05:46

## 2021-07-22 RX ADMIN — Medication 650 MILLIGRAM(S): at 18:18

## 2021-07-22 RX ADMIN — Medication 125 MICROGRAM(S): at 05:45

## 2021-07-22 RX ADMIN — Medication 40 MILLIGRAM(S): at 11:50

## 2021-07-22 RX ADMIN — ATORVASTATIN CALCIUM 40 MILLIGRAM(S): 80 TABLET, FILM COATED ORAL at 21:19

## 2021-07-22 RX ADMIN — OXYCODONE HYDROCHLORIDE 5 MILLIGRAM(S): 5 TABLET ORAL at 14:30

## 2021-07-22 RX ADMIN — LISINOPRIL 5 MILLIGRAM(S): 2.5 TABLET ORAL at 05:45

## 2021-07-22 RX ADMIN — Medication 100 MILLIGRAM(S): at 21:20

## 2021-07-22 RX ADMIN — OXYCODONE HYDROCHLORIDE 5 MILLIGRAM(S): 5 TABLET ORAL at 22:15

## 2021-07-22 RX ADMIN — OXYCODONE HYDROCHLORIDE 5 MILLIGRAM(S): 5 TABLET ORAL at 07:55

## 2021-07-22 NOTE — PROGRESS NOTE ADULT - SUBJECTIVE AND OBJECTIVE BOX
MANJEET YEBOAH is a 61yFemale , patient examined and chart reviewed.     INTERVAL HPI/ OVERNIGHT EVENTS:   No events.  Afebrile.    PAST MEDICAL & SURGICAL HISTORY:  Pneumonia due to COVID-19 virus  3/2021  inpatient at [facility]  Type 2 diabetes mellitus  Hypothyroid  Acid reflux  Venous insufficiency of leg  History of varicose veins  History of fibromyalgia  Osteoarthritis  Uterine hemorrhage  Severe obesity (BMI &gt;= 40)  H/O:   H/O: hysterectomy  S/P FESS (functional endoscopic sinus surgery)  2007  History of myringotomy  2006  Varicose vein of leg  right leg, excised 2021 thigh to knee  History of total knee replacement  bilateral 2005  History of revision of total replacement of knee joint  right 21      For details regarding the patient's social history, family history, and other miscellaneous elements, please refer the initial infectious diseases consultation and/or the admitting history and physical examination for this admission.    ROS:  CONSTITUTIONAL:  Negative fever or chills  EYES:  Negative  blurry vision or double vision  CARDIOVASCULAR:  Negative for chest pain or palpitations  RESPIRATORY:  Negative for cough, wheezing, or SOB   GASTROINTESTINAL:  Negative for nausea, vomiting, diarrhea, constipation, or abdominal pain  GENITOURINARY:  Negative frequency, urgency or dysuria  NEUROLOGIC:  No headache, confusion, dizziness, lightheadedness  All other systems were reviewed and are negative     ALLERGIES  Augmentin (Vomiting)      Current inpatient medications :    ANTIBIOTICS/RELEVANT:  ceFAZolin   IVPB 2000 milliGRAM(s) IV Intermittent every 8 hours    MEDICATIONS  (STANDING):  apixaban 5 milliGRAM(s) Oral every 12 hours  atorvastatin 40 milliGRAM(s) Oral at bedtime  FLUoxetine 40 milliGRAM(s) Oral daily  glucagon  Injectable 1 milliGRAM(s) IntraMuscular once  levothyroxine 125 MICROGram(s) Oral daily  lisinopril 5 milliGRAM(s) Oral daily  pantoprazole    Tablet 40 milliGRAM(s) Oral before breakfast  pregabalin 100 milliGRAM(s) Oral daily  pregabalin 50 milliGRAM(s) Oral <User Schedule>  senna 2 Tablet(s) Oral at bedtime    MEDICATIONS  (PRN):  acetaminophen   Tablet .. 650 milliGRAM(s) Oral every 6 hours PRN Mild Pain (1 - 3)  acetaminophen   Tablet .. 650 milliGRAM(s) Oral every 6 hours PRN Temp greater or equal to 38.5C (101.3F), Mild Pain (1 - 3)  aluminum hydroxide/magnesium hydroxide/simethicone Suspension 30 milliLiter(s) Oral every 4 hours PRN Dyspepsia  HYDROmorphone   Tablet 1 milliGRAM(s) Oral every 8 hours PRN Severe Pain (7 - 10)  melatonin 3 milliGRAM(s) Oral at bedtime PRN Insomnia  ondansetron Injectable 4 milliGRAM(s) IV Push every 8 hours PRN Nausea and/or Vomiting  oxyCODONE    IR 5 milliGRAM(s) Oral every 4 hours PRN Moderate Pain (4 - 6)      Objective:  Vital Signs Last 24 Hrs  T(C): 36.8 (2021 14:05), Max: 37.1 (2021 20:53)  T(F): 98.3 (2021 14:05), Max: 98.7 (2021 20:53)  HR: 68 (2021 14:05) (67 - 73)  BP: 128/65 (2021 14:05) (121/67 - 136/75)  RR: 18 (2021 14:05) (17 - 18)  SpO2: 99% (2021 14:05) (95% - 99%)    Physical Exam:  General: Obese no acute distress  Neck: supple, trachea midline  Lungs: clear, no wheeze/rhonchi  Cardiovascular: regular rate and rhythm, S1 S2  Abdomen: soft, nontender,  bowel sounds normal  Neurological: alert and oriented x3  Skin: no rash  Extremities: Right leg erythema improving less tender  right knee surgical site c/d/i          LABS:                        11.2   13.29 )-----------( 369      ( 2021 08:01 )             35.7   07-22    138  |  102  |  13  ----------------------------<  117<H>  4.4   |  30  |  0.87    Ca    8.9      2021 08:01      MICROBIOLOGY:    Culture - Blood (collected 2021 15:03)  Source: .Blood Blood  Preliminary Report (2021 16:01):    No growth to date.    Culture - Blood (collected 2021 15:03)  Source: .Blood Blood  Preliminary Report (2021 16:01):    No growth to date.    Culture - Urine (collected 2021 13:19)  Source: .Urine Clean Catch (Midstream)  Final Report (2021 11:43):    <10,000 CFU/mL Normal Urogenital Angelita    Culture - Blood (collected 2021 13:12)  Source: .Blood Blood-Peripheral  Preliminary Report (2021 14:01):    No growth to date.    Culture - Blood (collected 2021 13:12)  Source: .Blood Blood-Peripheral  Preliminary Report (2021 14:01):    No growth to date.      RADIOLOGY & ADDITIONAL STUDIES:  EXAM:  US DPLX LWR EXT VEINS LTD RT                                  PROCEDURE DATE:  2021          INTERPRETATION:  CLINICAL INFORMATION: Right lower extremity pain.    COMPARISON: None available.    TECHNIQUE: Duplex sonography of the RIGHTLOWER extremity veins with color and spectral Doppler, with and without compression.    FINDINGS:    There is normal compressibility of the right common femoral, femoral and popliteal veins.  The contralateral common femoral vein is patent.  Doppler examination shows normal spontaneous and phasic flow.    No calf vein thrombosis is detected, although the peroneal vein was not visualized.    There is subcutaneous edema in the calf.    IMPRESSION:  No evidence of right lower extremity deep venous thrombosis.  Suboptimal evaluation of the calf veins.    Assessment :   60 y/o F with PMH of DM type 2, Dyslipidemia, Hypothyroidism, COVID PNA 4 months ago, Chronic Venous Insufficiency s/p right leg phlebectomy in April, Fibromyalgia, GERD, OA, and Morbid Obesity recent R TKA on 2021 by Dr Low admitted with worsening RLE cellulitis with lymphangitis  Likely Strep  Improving    Plan :   Cont Ancef day 3  Fu cultures ( NGTD )  Trend temps and cbc  Elevate leg    D/w Ortho      Continue with present regiment.  Appropriate use of antibiotics and adverse effects reviewed.      I have discussed the above plan of care with patient in detail. She expressed understanding of the  treatment plan . Risks, benefits and alternatives discussed in detail. I have asked if she has any questions or concerns and appropriately addressed them to the best of my ability .    > 35 minutes were spent in direct patient care reviewing notes, medications ,labs data/ imaging , discussion with multidisciplinary team.    Thank you for allowing me to participate in care of your patient .    Berenice Jones MD  Infectious Disease  [phone]

## 2021-07-22 NOTE — PROGRESS NOTE ADULT - SUBJECTIVE AND OBJECTIVE BOX
Patient is a 61y old  Female who presents with a chief complaint of RLE cellulitis (2021 17:05)    HPI:  This is a 60 y/o F with PMH of DM type 2, Dyslipidemia, Hypothyroidism, COVID PNA 4 months ago, Chronic Venous Insufficiency s/p right leg phlebectomy in April, Fibromyalgia, GERD, OA, and Morbid Obesity who presented for RLE cellulitis.   Patient states that she began to notice RLE erythema and pain since this past Thursday. She states the erythema has been getting worse, she initially thought she got bit by an insect on her RLE, she presented to the ER on Saturday and was discharged on PO Keflex. She has been compliant with her oral antibiotics but the erythema has gotten worse. She also has a throbbing pain on the R anterior shin. She is compliant with her Eliquis dosing.  She has no other complaints.   She has not noted any drainage from her R TKA incision site. She had a R TKA on 2021.   RLE Duplex shows No evidence of right lower extremity deep venous thrombosis. Suboptimal evaluation of the calf veins.     (2021 13:20)    Today: No fevers. Pt still with redness to the RLE anterior tibial region. No fevers. Pt is ambulating.     PAST MEDICAL & SURGICAL HISTORY:  Pneumonia due to COVID-19 virus  3/2021  inpatient at [facility]    Type 2 diabetes mellitus    Hypothyroid    Acid reflux    Venous insufficiency of leg    History of varicose veins    History of fibromyalgia    Osteoarthritis    Uterine hemorrhage    Severe obesity (BMI &gt;= 40)    H/O:     H/O: hysterectomy    S/P FESS (functional endoscopic sinus surgery)  2007    History of myringotomy  2006    Varicose vein of leg  right leg, excised 2021 thigh to knee    History of total knee replacement  bilateral 2005    History of revision of total replacement of knee joint  right 21      MEDICATIONS  (STANDING):  apixaban 5 milliGRAM(s) Oral every 12 hours  atorvastatin 40 milliGRAM(s) Oral at bedtime  ceFAZolin   IVPB 2000 milliGRAM(s) IV Intermittent every 8 hours  FLUoxetine 40 milliGRAM(s) Oral daily  glucagon  Injectable 1 milliGRAM(s) IntraMuscular once  levothyroxine 125 MICROGram(s) Oral daily  lisinopril 5 milliGRAM(s) Oral daily  pantoprazole    Tablet 40 milliGRAM(s) Oral before breakfast  pregabalin 100 milliGRAM(s) Oral daily  pregabalin 50 milliGRAM(s) Oral <User Schedule>  senna 2 Tablet(s) Oral at bedtime    MEDICATIONS  (PRN):  acetaminophen   Tablet .. 650 milliGRAM(s) Oral every 6 hours PRN Mild Pain (1 - 3)  acetaminophen   Tablet .. 650 milliGRAM(s) Oral every 6 hours PRN Temp greater or equal to 38.5C (101.3F), Mild Pain (1 - 3)  aluminum hydroxide/magnesium hydroxide/simethicone Suspension 30 milliLiter(s) Oral every 4 hours PRN Dyspepsia  HYDROmorphone   Tablet 1 milliGRAM(s) Oral every 8 hours PRN Severe Pain (7 - 10)  melatonin 3 milliGRAM(s) Oral at bedtime PRN Insomnia  ondansetron Injectable 4 milliGRAM(s) IV Push every 8 hours PRN Nausea and/or Vomiting  oxyCODONE    IR 5 milliGRAM(s) Oral every 4 hours PRN Moderate Pain (4 - 6)      EXAM:  Vital Signs Last 24 Hrs  T(C): 36.8 (2021 14:05), Max: 37.1 (2021 20:53)  T(F): 98.3 (2021 14:05), Max: 98.7 (2021 20:53)  HR: 68 (2021 14:05) (68 - 73)  BP: 128/65 (2021 14:05) (121/67 - 136/69)  BP(mean): --  RR: 18 (2021 14:05) (17 - 18)  SpO2: 99% (2021 14:05) (95% - 99%)      PHYSICAL EXAM:  Constitutional: awake  Neck: supple  Extremities: Right lower extremity with right knee mid line incision which is clean and dry. Lower anterior region with persistent erythema.  Neurological: alert and oriented to person, date and place.   Skin: intact no rash, warm to touch.   Musculoskeletal: moves all 4 extremities  Psychiatric: appropriate affect.     LABS:                              11.2   13.29 )-----------( 369      ( 2021 08:01 )             35.7     07-22    138  |  102  |  13  ----------------------------<  117<H>  4.4   |  30  |  0.87    Ca    8.9      2021 08:01

## 2021-07-22 NOTE — PROGRESS NOTE ADULT - ASSESSMENT
This is a 62 y/o F with PMH of DM type 2, Dyslipidemia, Hypothyroidism, COVID PNA 4 months ago, Chronic Venous Insufficiency s/p right leg phlebectomy in April, Fibromyalgia, GERD, OA, and Morbid Obesity who presented for RLE cellulitis.     RLE cellulitis:   On Ancef Day 3.  trend WBC count and monitor for fevers.   f/u culture results   ID Dr Robert barnett     R TKA: performed on July 12, 2021  -surgical site does not appear to be infected.   -orthopedics consulted Dr Low.   -continue Eliquis 5mg q12 h [dose was confirmed with patient and increased dose was chosen due to patients increased risk for VTE]    -Pre diabetic Hgba1c of 6.4.   Pt states that she takes Metformin for other hormonal issues. Pt does not check her fingersticks at home. Pt used to follow with a Dr Cammy Mera. Pt is requesting a regular diet and does not want to be on a diabetic diet. Pt to follow on outpatient with her PCP.     Anemia:   no signs or symptoms of active bleeding. Continue to monitor hgb.     Depression and Fibromyalgia:   continue fluoxetine    Hypothyroidism:   continue synthroid.     HLD:   continue Lipitor    HTN:   continue Lisinopril    GERD:   continue Protonix    DVT ppx: continue Eliquis

## 2021-07-23 LAB
ANION GAP SERPL CALC-SCNC: 6 MMOL/L — SIGNIFICANT CHANGE UP (ref 5–17)
BUN SERPL-MCNC: 13 MG/DL — SIGNIFICANT CHANGE UP (ref 7–23)
CALCIUM SERPL-MCNC: 9.2 MG/DL — SIGNIFICANT CHANGE UP (ref 8.4–10.5)
CHLORIDE SERPL-SCNC: 101 MMOL/L — SIGNIFICANT CHANGE UP (ref 96–108)
CO2 SERPL-SCNC: 31 MMOL/L — SIGNIFICANT CHANGE UP (ref 22–31)
CREAT SERPL-MCNC: 0.89 MG/DL — SIGNIFICANT CHANGE UP (ref 0.5–1.3)
CULTURE RESULTS: SIGNIFICANT CHANGE UP
CULTURE RESULTS: SIGNIFICANT CHANGE UP
GLUCOSE SERPL-MCNC: 113 MG/DL — HIGH (ref 70–99)
HCT VFR BLD CALC: 37 % — SIGNIFICANT CHANGE UP (ref 34.5–45)
HGB BLD-MCNC: 11.4 G/DL — LOW (ref 11.5–15.5)
MCHC RBC-ENTMCNC: 26.8 PG — LOW (ref 27–34)
MCHC RBC-ENTMCNC: 30.8 GM/DL — LOW (ref 32–36)
MCV RBC AUTO: 86.9 FL — SIGNIFICANT CHANGE UP (ref 80–100)
NRBC # BLD: 0 /100 WBCS — SIGNIFICANT CHANGE UP (ref 0–0)
PLATELET # BLD AUTO: 386 K/UL — SIGNIFICANT CHANGE UP (ref 150–400)
POTASSIUM SERPL-MCNC: 4.3 MMOL/L — SIGNIFICANT CHANGE UP (ref 3.5–5.3)
POTASSIUM SERPL-SCNC: 4.3 MMOL/L — SIGNIFICANT CHANGE UP (ref 3.5–5.3)
RBC # BLD: 4.26 M/UL — SIGNIFICANT CHANGE UP (ref 3.8–5.2)
RBC # FLD: 14.6 % — HIGH (ref 10.3–14.5)
SODIUM SERPL-SCNC: 138 MMOL/L — SIGNIFICANT CHANGE UP (ref 135–145)
SPECIMEN SOURCE: SIGNIFICANT CHANGE UP
SPECIMEN SOURCE: SIGNIFICANT CHANGE UP
WBC # BLD: 12.94 K/UL — HIGH (ref 3.8–10.5)
WBC # FLD AUTO: 12.94 K/UL — HIGH (ref 3.8–10.5)

## 2021-07-23 PROCEDURE — 99232 SBSQ HOSP IP/OBS MODERATE 35: CPT

## 2021-07-23 RX ADMIN — Medication 650 MILLIGRAM(S): at 18:05

## 2021-07-23 RX ADMIN — Medication 650 MILLIGRAM(S): at 08:24

## 2021-07-23 RX ADMIN — OXYCODONE HYDROCHLORIDE 5 MILLIGRAM(S): 5 TABLET ORAL at 22:09

## 2021-07-23 RX ADMIN — Medication 100 MILLIGRAM(S): at 21:10

## 2021-07-23 RX ADMIN — OXYCODONE HYDROCHLORIDE 5 MILLIGRAM(S): 5 TABLET ORAL at 21:09

## 2021-07-23 RX ADMIN — Medication 650 MILLIGRAM(S): at 17:35

## 2021-07-23 RX ADMIN — Medication 125 MICROGRAM(S): at 05:28

## 2021-07-23 RX ADMIN — PANTOPRAZOLE SODIUM 40 MILLIGRAM(S): 20 TABLET, DELAYED RELEASE ORAL at 05:28

## 2021-07-23 RX ADMIN — OXYCODONE HYDROCHLORIDE 5 MILLIGRAM(S): 5 TABLET ORAL at 14:30

## 2021-07-23 RX ADMIN — Medication 40 MILLIGRAM(S): at 11:30

## 2021-07-23 RX ADMIN — Medication 50 MILLIGRAM(S): at 17:35

## 2021-07-23 RX ADMIN — ATORVASTATIN CALCIUM 40 MILLIGRAM(S): 80 TABLET, FILM COATED ORAL at 21:10

## 2021-07-23 RX ADMIN — APIXABAN 5 MILLIGRAM(S): 2.5 TABLET, FILM COATED ORAL at 21:10

## 2021-07-23 RX ADMIN — Medication 100 MILLIGRAM(S): at 11:30

## 2021-07-23 RX ADMIN — LISINOPRIL 5 MILLIGRAM(S): 2.5 TABLET ORAL at 05:28

## 2021-07-23 RX ADMIN — Medication 650 MILLIGRAM(S): at 09:00

## 2021-07-23 RX ADMIN — Medication 100 MILLIGRAM(S): at 05:29

## 2021-07-23 RX ADMIN — OXYCODONE HYDROCHLORIDE 5 MILLIGRAM(S): 5 TABLET ORAL at 13:53

## 2021-07-23 RX ADMIN — APIXABAN 5 MILLIGRAM(S): 2.5 TABLET, FILM COATED ORAL at 08:22

## 2021-07-23 RX ADMIN — OXYCODONE HYDROCHLORIDE 5 MILLIGRAM(S): 5 TABLET ORAL at 09:53

## 2021-07-23 RX ADMIN — OXYCODONE HYDROCHLORIDE 5 MILLIGRAM(S): 5 TABLET ORAL at 10:30

## 2021-07-23 RX ADMIN — Medication 100 MILLIGRAM(S): at 13:53

## 2021-07-23 NOTE — PROGRESS NOTE ADULT - SUBJECTIVE AND OBJECTIVE BOX
ORTHOPEDIC PA PROGRESS NOTE  MANJEET YEBOAH      61y Female                                                                                                                               Admitted 7/19 with right leg cellulitis following a total knee replacement by Dr. Low 7/12                           Pain (0-10): 4   Current Pain Management:  [ ] PCA   [x] Po Analgesics [ ] IM /IV Anagesics     T(F): 97.8  HR: 63  BP: 115/70  RR: 16  SpO2: 98%                        11.4   12.94 )-----------( 386      ( 23 Jul 2021 07:20 )             37.0                     07-23    138  |  101  |  13  ----------------------------<  113<H>  4.3   |  31  |  0.89    Ca    9.2      23 Jul 2021 07:20      Physical Exam :    -  Prineo c/d/i  -   Incision c/d/i.  No erythema, no warmth, no discharge from wound   -   Distal Neurvascular status intact grossly.   -   Warm well perfused; capillary refill <3 seconds   -   (+)EHL/FHL 5/5  -   (+) Sensation to light touch  -   (-) Calf tenderness Bilaterally  -Right leg cellulitis-  anterior/posterior borders marked with pen, patient states she believes leg is more red and tender today.    A/P: 61y Female s/p   -   Ortho Stable  -   Pain control   -   Medicine to follow  -   DVT ppx:     [x ]SCDs     [ ] ASA     [x ] Eliquis     [ ] Lovenox  -   Weight bearing status:  WBAT [x ]        PWB    [ ]     TTWB  [ ]      NWB  [ ]   -  Dispo:     Home [ ]     Acute Rehab [ ]     FREDI [ ]     TBD []  -  Patient is currently on Ancef for cellulitis, blood cultures negative, WBC count trending down, afebrile, patient complaining of more tenderness in cellulitic area.  Borders marked.  Dr. Jones to follow up from ID

## 2021-07-23 NOTE — PROGRESS NOTE ADULT - SUBJECTIVE AND OBJECTIVE BOX
Patient is a 61y old  Female who presents with a chief complaint of Right lower extremity cellulitis (2021 11:03)    HPI:  This is a 60 y/o F with PMH of DM type 2, Dyslipidemia, Hypothyroidism, COVID PNA 4 months ago, Chronic Venous Insufficiency s/p right leg phlebectomy in April, Fibromyalgia, GERD, OA, and Morbid Obesity who presented for RLE cellulitis.   Patient states that she began to notice RLE erythema and pain since this past Thursday. She states the erythema has been getting worse, she initially thought she got bit by an insect on her RLE, she presented to the ER on Saturday and was discharged on PO Keflex. She has been compliant with her oral antibiotics but the erythema has gotten worse. She also has a throbbing pain on the R anterior shin. She is compliant with her Eliquis dosing.  She has no other complaints.   She has not noted any drainage from her R TKA incision site. She had a R TKA on 2021.   RLE Duplex shows No evidence of right lower extremity deep venous thrombosis. Suboptimal evaluation of the calf veins.   (2021 13:20)    Today:   Pt c.o rash on her back. Also notes pain in the right calf is still present. Redness is improving. No fevers.     PAST MEDICAL & SURGICAL HISTORY:  Pneumonia due to COVID-19 virus  3/2021  inpatient at [facility]    Type 2 diabetes mellitus    Hypothyroid    Acid reflux    Venous insufficiency of leg    History of varicose veins    History of fibromyalgia    Osteoarthritis    Uterine hemorrhage    Severe obesity (BMI &gt;= 40)    H/O:     H/O: hysterectomy    S/P FESS (functional endoscopic sinus surgery)  2007    History of myringotomy  2006    Varicose vein of leg  right leg, excised 2021 thigh to knee    History of total knee replacement  bilateral 2005    History of revision of total replacement of knee joint  right 21    MEDICATIONS  (STANDING):  apixaban 5 milliGRAM(s) Oral every 12 hours  atorvastatin 40 milliGRAM(s) Oral at bedtime  ceFAZolin   IVPB 2000 milliGRAM(s) IV Intermittent every 8 hours  FLUoxetine 40 milliGRAM(s) Oral daily  glucagon  Injectable 1 milliGRAM(s) IntraMuscular once  levothyroxine 125 MICROGram(s) Oral daily  lisinopril 5 milliGRAM(s) Oral daily  pantoprazole    Tablet 40 milliGRAM(s) Oral before breakfast  pregabalin 100 milliGRAM(s) Oral daily  pregabalin 50 milliGRAM(s) Oral <User Schedule>  senna 2 Tablet(s) Oral at bedtime    MEDICATIONS  (PRN):  acetaminophen   Tablet .. 650 milliGRAM(s) Oral every 6 hours PRN Mild Pain (1 - 3)  acetaminophen   Tablet .. 650 milliGRAM(s) Oral every 6 hours PRN Temp greater or equal to 38.5C (101.3F), Mild Pain (1 - 3)  aluminum hydroxide/magnesium hydroxide/simethicone Suspension 30 milliLiter(s) Oral every 4 hours PRN Dyspepsia  HYDROmorphone   Tablet 1 milliGRAM(s) Oral every 8 hours PRN Severe Pain (7 - 10)  melatonin 3 milliGRAM(s) Oral at bedtime PRN Insomnia  ondansetron Injectable 4 milliGRAM(s) IV Push every 8 hours PRN Nausea and/or Vomiting  oxyCODONE    IR 5 milliGRAM(s) Oral every 4 hours PRN Moderate Pain (4 - 6)    EXAM:  Vital Signs Last 24 Hrs  T(C): 37.1 (2021 14:40), Max: 37.3 (2021 18:18)  T(F): 98.7 (2021 14:40), Max: 99.2 (2021 11:35)  HR: 74 (2021 14:40) (63 - 78)  BP: 107/67 (2021 14:40) (107/67 - 145/91)  BP(mean): --  RR: 18 (2021 14:40) (16 - 20)  SpO2: 96% (2021 14:40) (96% - 98%)    PHYSICAL EXAM:  Constitutional: awake, nad.   Neck: supple  Respiratory: bilaterally clear to auscultation, no wheezing, no rhonchi, no crackles, no decreased air entry  Cardiovascular: s1s2, rrr  Gastrointestinal: soft, non tender, +bowel sounds, no rebound, no guarding.   Extremities: Right lower extremity with right knee mid line incision which is clean and dry. Lower anterior region with improving erythema. Area is marked by a pen.   Neurological: alert and oriented to person, date and place.   Musculoskeletal: moves all 4 extremities  Psychiatric: appropriate affect.   Back: small area of erythema to the back.     LABS:                   11.4   12.94 )-----------( 386      ( 2021 07:20 )             37.0         138  |  101  |  13  ----------------------------<  113<H>  4.3   |  31  |  0.89    Ca    9.2      2021 07:20

## 2021-07-23 NOTE — PROGRESS NOTE ADULT - ASSESSMENT
his is a 62 y/o F with PMH of DM type 2, Dyslipidemia, Hypothyroidism, COVID PNA 4 months ago, Chronic Venous Insufficiency s/p right leg phlebectomy in April, Fibromyalgia, GERD, OA, and Morbid Obesity who presented for RLE cellulitis.     RLE cellulitis:   On Ancef Day 4  wbc stable. Afebrile  f/u culture results   ID Dr Robert barnett     R TKA: performed on July 12, 2021  -surgical site does not appear to be infected.   -orthopedics consulted Dr Low.   -continue Eliquis 5mg q12 h [dose was confirmed with patient and increased dose was chosen due to patients increased risk for VTE]    -Pre diabetic Hgba1c of 6.4.   Pt states that she takes Metformin for other hormonal issues. Pt does not check her fingersticks at home. Pt used to follow with a Dr Cammy Mera. Pt is requesting a regular diet and does not want to be on a diabetic diet. Pt to follow up outpatient with her PCP.     Anemia:   no signs or symptoms of active bleeding. Continue to monitor hgb.     Depression and Fibromyalgia:   continue fluoxetine    Hypothyroidism:   continue synthroid.     HLD:   continue Lipitor    HTN:   continue Lisinopril    GERD:   continue Protonix    DVT ppx: continue Eliquis    Dispo: Possible dc on Monday once cellulitis is improved.

## 2021-07-23 NOTE — PROGRESS NOTE ADULT - SUBJECTIVE AND OBJECTIVE BOX
MANJEET YEBOAH is a 61yFemale , patient examined and chart reviewed.     INTERVAL HPI/ OVERNIGHT EVENTS:   Has rash on back. No events. NAD  Afebrile.    PAST MEDICAL & SURGICAL HISTORY:  Pneumonia due to COVID-19 virus  3/2021  inpatient at [facility]  Type 2 diabetes mellitus  Hypothyroid  Acid reflux  Venous insufficiency of leg  History of varicose veins  History of fibromyalgia  Osteoarthritis  Uterine hemorrhage  Severe obesity (BMI &gt;= 40)  H/O:   H/O: hysterectomy  S/P FESS (functional endoscopic sinus surgery)  2007  History of myringotomy  2006  Varicose vein of leg  right leg, excised 2021 thigh to knee  History of total knee replacement  bilateral 2005  History of revision of total replacement of knee joint  right 21      For details regarding the patient's social history, family history, and other miscellaneous elements, please refer the initial infectious diseases consultation and/or the admitting history and physical examination for this admission.    ROS:  CONSTITUTIONAL:  Negative fever or chills  EYES:  Negative  blurry vision or double vision  CARDIOVASCULAR:  Negative for chest pain or palpitations  RESPIRATORY:  Negative for cough, wheezing, or SOB   GASTROINTESTINAL:  Negative for nausea, vomiting, diarrhea, constipation, or abdominal pain  GENITOURINARY:  Negative frequency, urgency or dysuria  NEUROLOGIC:  No headache, confusion, dizziness, lightheadedness  All other systems were reviewed and are negative     ALLERGIES  Augmentin (Vomiting)      Current inpatient medications :    ANTIBIOTICS/RELEVANT:  ceFAZolin   IVPB 2000 milliGRAM(s) IV Intermittent every 8 hours    MEDICATIONS  (STANDING):  apixaban 5 milliGRAM(s) Oral every 12 hours  atorvastatin 40 milliGRAM(s) Oral at bedtime  FLUoxetine 40 milliGRAM(s) Oral daily  glucagon  Injectable 1 milliGRAM(s) IntraMuscular once  levothyroxine 125 MICROGram(s) Oral daily  lisinopril 5 milliGRAM(s) Oral daily  pantoprazole    Tablet 40 milliGRAM(s) Oral before breakfast  pregabalin 100 milliGRAM(s) Oral daily  pregabalin 50 milliGRAM(s) Oral <User Schedule>  senna 2 Tablet(s) Oral at bedtime    MEDICATIONS  (PRN):  acetaminophen   Tablet .. 650 milliGRAM(s) Oral every 6 hours PRN Mild Pain (1 - 3)  acetaminophen   Tablet .. 650 milliGRAM(s) Oral every 6 hours PRN Temp greater or equal to 38.5C (101.3F), Mild Pain (1 - 3)  aluminum hydroxide/magnesium hydroxide/simethicone Suspension 30 milliLiter(s) Oral every 4 hours PRN Dyspepsia  HYDROmorphone   Tablet 1 milliGRAM(s) Oral every 8 hours PRN Severe Pain (7 - 10)  melatonin 3 milliGRAM(s) Oral at bedtime PRN Insomnia  ondansetron Injectable 4 milliGRAM(s) IV Push every 8 hours PRN Nausea and/or Vomiting  oxyCODONE    IR 5 milliGRAM(s) Oral every 4 hours PRN Moderate Pain (4 - 6)        Objective:  Vital Signs Last 24 Hrs  T(C): 37.1 (2021 14:40), Max: 37.3 (2021 11:35)  T(F): 98.7 (2021 14:40), Max: 99.2 (2021 11:35)  HR: 74 (2021 14:40) (63 - 78)  BP: 107/67 (2021 14:40) (107/67 - 145/91)  RR: 18 (2021 14:40) (16 - 18)  SpO2: 96% (2021 14:40) (96% - 98%)    Physical Exam:  General: Obese no acute distress  Neck: supple, trachea midline  Lungs: clear, no wheeze/rhonchi  Cardiovascular: regular rate and rhythm, S1 S2  Abdomen: soft, nontender,  bowel sounds normal  Neurological: alert and oriented x3  Skin: Rash on back  Extremities: Right leg erythema improving less tender  right knee surgical site c/d/i      LABS:                        11.4   12.94 )-----------( 386      ( 2021 07:20 )             37.0   07-23    138  |  101  |  13  ----------------------------<  113<H>  4.3   |  31  |  0.89    Ca    9.2      2021 07:20      MICROBIOLOGY:    Culture - Blood (collected 2021 15:03)  Source: .Blood Blood  Preliminary Report (2021 16:01):    No growth to date.    Culture - Blood (collected 2021 15:03)  Source: .Blood Blood  Preliminary Report (2021 16:01):    No growth to date.    Culture - Urine (collected 2021 13:19)  Source: .Urine Clean Catch (Midstream)  Final Report (2021 11:43):    <10,000 CFU/mL Normal Urogenital Angelita    Culture - Blood (collected 2021 13:12)  Source: .Blood Blood-Peripheral  Preliminary Report (2021 14:01):    No growth to date.    Culture - Blood (collected 2021 13:12)  Source: .Blood Blood-Peripheral  Preliminary Report (2021 14:01):    No growth to date.      RADIOLOGY & ADDITIONAL STUDIES:  EXAM:  US DPLX LWR EXT VEINS LTD RT                                  PROCEDURE DATE:  2021          INTERPRETATION:  CLINICAL INFORMATION: Right lower extremity pain.    COMPARISON: None available.    TECHNIQUE: Duplex sonography of the RIGHTLOWER extremity veins with color and spectral Doppler, with and without compression.    FINDINGS:    There is normal compressibility of the right common femoral, femoral and popliteal veins.  The contralateral common femoral vein is patent.  Doppler examination shows normal spontaneous and phasic flow.    No calf vein thrombosis is detected, although the peroneal vein was not visualized.    There is subcutaneous edema in the calf.    IMPRESSION:  No evidence of right lower extremity deep venous thrombosis.  Suboptimal evaluation of the calf veins.    Assessment :   60 y/o F with PMH of DM type 2, Dyslipidemia, Hypothyroidism, COVID PNA 4 months ago, Chronic Venous Insufficiency s/p right leg phlebectomy in April, Fibromyalgia, GERD, OA, and Morbid Obesity recent R TKA on 2021 by Dr Low admitted with worsening RLE cellulitis with lymphangitis  Likely Strep  Rash on back likely sec contact dermatitis- doubt allergic reaction to Ancef  Improving    Plan :   Cont Ancef day 4  Fu cultures ( NGTD )  Trend temps and cbc  Elevate leg        Continue with present regiment.  Appropriate use of antibiotics and adverse effects reviewed.      I have discussed the above plan of care with patient in detail. She expressed understanding of the  treatment plan . Risks, benefits and alternatives discussed in detail. I have asked if she has any questions or concerns and appropriately addressed them to the best of my ability .    > 35 minutes were spent in direct patient care reviewing notes, medications ,labs data/ imaging , discussion with multidisciplinary team.    Thank you for allowing me to participate in care of your patient .    Berenice Jones MD  Infectious Disease  [phone]

## 2021-07-24 LAB
ANION GAP SERPL CALC-SCNC: 8 MMOL/L — SIGNIFICANT CHANGE UP (ref 5–17)
BUN SERPL-MCNC: 12 MG/DL — SIGNIFICANT CHANGE UP (ref 7–23)
CALCIUM SERPL-MCNC: 8.7 MG/DL — SIGNIFICANT CHANGE UP (ref 8.4–10.5)
CHLORIDE SERPL-SCNC: 103 MMOL/L — SIGNIFICANT CHANGE UP (ref 96–108)
CO2 SERPL-SCNC: 28 MMOL/L — SIGNIFICANT CHANGE UP (ref 22–31)
CREAT SERPL-MCNC: 0.79 MG/DL — SIGNIFICANT CHANGE UP (ref 0.5–1.3)
CULTURE RESULTS: SIGNIFICANT CHANGE UP
CULTURE RESULTS: SIGNIFICANT CHANGE UP
GLUCOSE SERPL-MCNC: 110 MG/DL — HIGH (ref 70–99)
HCT VFR BLD CALC: 33.2 % — LOW (ref 34.5–45)
HGB BLD-MCNC: 10.4 G/DL — LOW (ref 11.5–15.5)
MCHC RBC-ENTMCNC: 26.9 PG — LOW (ref 27–34)
MCHC RBC-ENTMCNC: 31.3 GM/DL — LOW (ref 32–36)
MCV RBC AUTO: 86 FL — SIGNIFICANT CHANGE UP (ref 80–100)
NRBC # BLD: 0 /100 WBCS — SIGNIFICANT CHANGE UP (ref 0–0)
PLATELET # BLD AUTO: 333 K/UL — SIGNIFICANT CHANGE UP (ref 150–400)
POTASSIUM SERPL-MCNC: 4.3 MMOL/L — SIGNIFICANT CHANGE UP (ref 3.5–5.3)
POTASSIUM SERPL-SCNC: 4.3 MMOL/L — SIGNIFICANT CHANGE UP (ref 3.5–5.3)
RBC # BLD: 3.86 M/UL — SIGNIFICANT CHANGE UP (ref 3.8–5.2)
RBC # FLD: 14.2 % — SIGNIFICANT CHANGE UP (ref 10.3–14.5)
SODIUM SERPL-SCNC: 139 MMOL/L — SIGNIFICANT CHANGE UP (ref 135–145)
SPECIMEN SOURCE: SIGNIFICANT CHANGE UP
SPECIMEN SOURCE: SIGNIFICANT CHANGE UP
WBC # BLD: 10.86 K/UL — HIGH (ref 3.8–10.5)
WBC # FLD AUTO: 10.86 K/UL — HIGH (ref 3.8–10.5)

## 2021-07-24 PROCEDURE — 99232 SBSQ HOSP IP/OBS MODERATE 35: CPT

## 2021-07-24 RX ADMIN — Medication 3 MILLIGRAM(S): at 21:25

## 2021-07-24 RX ADMIN — APIXABAN 5 MILLIGRAM(S): 2.5 TABLET, FILM COATED ORAL at 21:26

## 2021-07-24 RX ADMIN — Medication 125 MICROGRAM(S): at 05:34

## 2021-07-24 RX ADMIN — OXYCODONE HYDROCHLORIDE 5 MILLIGRAM(S): 5 TABLET ORAL at 17:15

## 2021-07-24 RX ADMIN — Medication 100 MILLIGRAM(S): at 14:52

## 2021-07-24 RX ADMIN — Medication 650 MILLIGRAM(S): at 11:25

## 2021-07-24 RX ADMIN — OXYCODONE HYDROCHLORIDE 5 MILLIGRAM(S): 5 TABLET ORAL at 16:43

## 2021-07-24 RX ADMIN — SENNA PLUS 2 TABLET(S): 8.6 TABLET ORAL at 21:26

## 2021-07-24 RX ADMIN — Medication 650 MILLIGRAM(S): at 10:55

## 2021-07-24 RX ADMIN — Medication 100 MILLIGRAM(S): at 12:13

## 2021-07-24 RX ADMIN — OXYCODONE HYDROCHLORIDE 5 MILLIGRAM(S): 5 TABLET ORAL at 12:40

## 2021-07-24 RX ADMIN — Medication 50 MILLIGRAM(S): at 18:03

## 2021-07-24 RX ADMIN — ATORVASTATIN CALCIUM 40 MILLIGRAM(S): 80 TABLET, FILM COATED ORAL at 21:25

## 2021-07-24 RX ADMIN — Medication 40 MILLIGRAM(S): at 12:07

## 2021-07-24 RX ADMIN — Medication 100 MILLIGRAM(S): at 21:26

## 2021-07-24 RX ADMIN — OXYCODONE HYDROCHLORIDE 5 MILLIGRAM(S): 5 TABLET ORAL at 21:25

## 2021-07-24 RX ADMIN — OXYCODONE HYDROCHLORIDE 5 MILLIGRAM(S): 5 TABLET ORAL at 22:25

## 2021-07-24 RX ADMIN — OXYCODONE HYDROCHLORIDE 5 MILLIGRAM(S): 5 TABLET ORAL at 05:33

## 2021-07-24 RX ADMIN — LISINOPRIL 5 MILLIGRAM(S): 2.5 TABLET ORAL at 05:33

## 2021-07-24 RX ADMIN — OXYCODONE HYDROCHLORIDE 5 MILLIGRAM(S): 5 TABLET ORAL at 12:07

## 2021-07-24 RX ADMIN — APIXABAN 5 MILLIGRAM(S): 2.5 TABLET, FILM COATED ORAL at 09:11

## 2021-07-24 RX ADMIN — Medication 100 MILLIGRAM(S): at 05:34

## 2021-07-24 RX ADMIN — PANTOPRAZOLE SODIUM 40 MILLIGRAM(S): 20 TABLET, DELAYED RELEASE ORAL at 05:34

## 2021-07-24 NOTE — PROGRESS NOTE ADULT - SUBJECTIVE AND OBJECTIVE BOX
[facility], Division of Infectious Diseases  ROSANA Madera Y. Patel, S. Shah  [phone]    Name: MANJEET YEBOAH  Age: 61y  Gender: Female  MRN: [id number]    Interval History:  Patient seen and examined at bedside this morning  No acute overnight events. Afebrile  Reporting LE pain still, improving slightly  Mildly pruritic on back but reports that it is not bothersome at this time.   Notes reviewed    Antibiotics:  ceFAZolin   IVPB 2000 milliGRAM(s) IV Intermittent every 8 hours      Medications:  acetaminophen   Tablet .. 650 milliGRAM(s) Oral every 6 hours PRN  acetaminophen   Tablet .. 650 milliGRAM(s) Oral every 6 hours PRN  aluminum hydroxide/magnesium hydroxide/simethicone Suspension 30 milliLiter(s) Oral every 4 hours PRN  apixaban 5 milliGRAM(s) Oral every 12 hours  atorvastatin 40 milliGRAM(s) Oral at bedtime  ceFAZolin   IVPB 2000 milliGRAM(s) IV Intermittent every 8 hours  FLUoxetine 40 milliGRAM(s) Oral daily  glucagon  Injectable 1 milliGRAM(s) IntraMuscular once  HYDROmorphone   Tablet 1 milliGRAM(s) Oral every 8 hours PRN  levothyroxine 125 MICROGram(s) Oral daily  lisinopril 5 milliGRAM(s) Oral daily  melatonin 3 milliGRAM(s) Oral at bedtime PRN  ondansetron Injectable 4 milliGRAM(s) IV Push every 8 hours PRN  oxyCODONE    IR 5 milliGRAM(s) Oral every 4 hours PRN  pantoprazole    Tablet 40 milliGRAM(s) Oral before breakfast  pregabalin 100 milliGRAM(s) Oral daily  pregabalin 50 milliGRAM(s) Oral <User Schedule>  senna 2 Tablet(s) Oral at bedtime      Review of Systems:  A 10-point review of systems was obtained.   Review of systems otherwise negative except as previously noted.    Allergies: No Known Allergies    For details regarding the patient's past medical history, social history, family history, and other miscellaneous elements, please refer the initial infectious diseases consultation and/or the admitting history and physical examination for this admission.    Objective:  Vitals:   T(C): 36.7 (07-24-21 @ 09:45), Max: 37.1 (07-23-21 @ 14:40)  HR: 90 (07-24-21 @ 09:45) (63 - 90)  BP: 112/62 (07-24-21 @ 09:45) (97/61 - 113/62)  RR: 16 (07-24-21 @ 09:45) (16 - 18)  SpO2: 95% (07-24-21 @ 09:45) (94% - 97%)    Physical Examination:  General: no acute distress  HEENT: NC/AT, EOMI, anicteric, no oral lesions  Neck: supple, no palpable LAD  Cardio: S1, S2 heard, RRR, no murmurs  Resp: breath sounds heard bilaterally, no rales, wheezes or rhonchi  Abd: soft, NT, ND, + bowel sounds  Neuro: no obvious focal deficits  Ext: b/l LE edema. LLE chronic venous stasis changes  RLE w/ knee surgery incision well healed. erythema to knee, receding from previously demarcated area  Skin: warm, dry, no visible rash      Laboratory Studies:  CBC:                       10.4   10.86 )-----------( 333      ( 24 Jul 2021 07:22 )             33.2     CMP: 07-24    139  |  103  |  12  ----------------------------<  110<H>  4.3   |  28  |  0.79    Ca    8.7      24 Jul 2021 07:22            Microbiology: reviewed    Culture - Blood (collected 07-19-21 @ 15:03)  Source: .Blood Blood  Preliminary Report (07-20-21 @ 16:01):    No growth to date.    Culture - Blood (collected 07-19-21 @ 15:03)  Source: .Blood Blood  Preliminary Report (07-20-21 @ 16:01):    No growth to date.    Culture - Urine (collected 07-18-21 @ 13:19)  Source: .Urine Clean Catch (Midstream)  Final Report (07-19-21 @ 11:43):    <10,000 CFU/mL Normal Urogenital Angelita    Culture - Blood (collected 07-18-21 @ 13:12)  Source: .Blood Blood-Peripheral  Final Report (07-23-21 @ 14:00):    No Growth Final    Culture - Blood (collected 07-18-21 @ 13:12)  Source: .Blood Blood-Peripheral  Final Report (07-23-21 @ 14:00):    No Growth Final    Culture - Body Fluid with Gram Stain (collected 07-12-21 @ 22:14)  Source: .Body Fluid Joint Fluid  Gram Stain (07-13-21 @ 02:33):    No polymorphonuclear cells seen    No organisms seen    by cytocentrifuge  Preliminary Report (07-17-21 @ 17:28):    No growth at 5 days        Radiology: reviewed

## 2021-07-24 NOTE — PROGRESS NOTE ADULT - ASSESSMENT
his is a 60 y/o F with PMH of DM type 2, Dyslipidemia, Hypothyroidism, COVID PNA 4 months ago, Chronic Venous Insufficiency s/p right leg phlebectomy in April, Fibromyalgia, GERD, OA, and Morbid Obesity who presented for RLE cellulitis.     RLE cellulitis:   On Ancef Day 5  wbc stable. Afebrile  f/u culture results   ID Dr Robert barnett     R TKA: performed on July 12, 2021  -surgical site does not appear to be infected.   -orthopedics consulted Dr Low.   -continue Eliquis 5mg q12 h [dose was confirmed with patient and increased dose was chosen due to patients increased risk for VTE]    -Pre diabetic Hgba1c of 6.4.   Pt states that she takes Metformin for other hormonal issues. Pt does not check her fingersticks at home. Pt used to follow with a Dr Cammy Mera. Pt is requesting a regular diet and does not want to be on a diabetic diet. Pt to follow up outpatient with her PCP.     Anemia:   no signs or symptoms of active bleeding. Continue to monitor hgb.     Depression and Fibromyalgia:   continue fluoxetine    Hypothyroidism:   continue synthroid.     HLD:   continue Lipitor    HTN:   continue Lisinopril    GERD:   continue Protonix    DVT ppx: continue Eliquis    Dispo: Discharge plan pending hospital course.    Plan of care was discuss with patient, all questions were answered, seems understand and in agreement.

## 2021-07-24 NOTE — PROGRESS NOTE ADULT - SUBJECTIVE AND OBJECTIVE BOX
Patient is a 61y old  Female who presents with a chief complaint of RLE cellulitis (23 Jul 2021 18:46)      INTERVAL HPI/OVERNIGHT EVENTS:  Pt is seen and examined.  feels better. Pain is controlled.  Pain Location & Control:     MEDICATIONS  (STANDING):  apixaban 5 milliGRAM(s) Oral every 12 hours  atorvastatin 40 milliGRAM(s) Oral at bedtime  ceFAZolin   IVPB 2000 milliGRAM(s) IV Intermittent every 8 hours  FLUoxetine 40 milliGRAM(s) Oral daily  glucagon  Injectable 1 milliGRAM(s) IntraMuscular once  levothyroxine 125 MICROGram(s) Oral daily  lisinopril 5 milliGRAM(s) Oral daily  pantoprazole    Tablet 40 milliGRAM(s) Oral before breakfast  pregabalin 100 milliGRAM(s) Oral daily  pregabalin 50 milliGRAM(s) Oral <User Schedule>  senna 2 Tablet(s) Oral at bedtime    MEDICATIONS  (PRN):  acetaminophen   Tablet .. 650 milliGRAM(s) Oral every 6 hours PRN Mild Pain (1 - 3)  acetaminophen   Tablet .. 650 milliGRAM(s) Oral every 6 hours PRN Temp greater or equal to 38.5C (101.3F), Mild Pain (1 - 3)  aluminum hydroxide/magnesium hydroxide/simethicone Suspension 30 milliLiter(s) Oral every 4 hours PRN Dyspepsia  HYDROmorphone   Tablet 1 milliGRAM(s) Oral every 8 hours PRN Severe Pain (7 - 10)  melatonin 3 milliGRAM(s) Oral at bedtime PRN Insomnia  ondansetron Injectable 4 milliGRAM(s) IV Push every 8 hours PRN Nausea and/or Vomiting  oxyCODONE    IR 5 milliGRAM(s) Oral every 4 hours PRN Moderate Pain (4 - 6)      Allergies    No Known Allergies    Intolerances    Augmentin (Vomiting)      Vital Signs Last 24 Hrs  T(C): 36.7 (24 Jul 2021 09:45), Max: 37.3 (23 Jul 2021 11:35)  T(F): 98.1 (24 Jul 2021 09:45), Max: 99.2 (23 Jul 2021 11:35)  HR: 90 (24 Jul 2021 09:45) (63 - 90)  BP: 112/62 (24 Jul 2021 09:45) (97/61 - 135/71)  BP(mean): --  RR: 16 (24 Jul 2021 09:45) (16 - 18)  SpO2: 95% (24 Jul 2021 09:45) (94% - 97%)        LABS:                        10.4   10.86 )-----------( 333      ( 24 Jul 2021 07:22 )             33.2     24 Jul 2021 07:22    139    |  103    |  12     ----------------------------<  110    4.3     |  28     |  0.79     Ca    8.7        24 Jul 2021 07:22          CAPILLARY BLOOD GLUCOSE      Cultures  Culture Results:   No growth to date. (07-19 @ 15:03)  Culture Results:   No growth to date. (07-19 @ 15:03)  Culture Results:   <10,000 CFU/mL Normal Urogenital Angelita (07-18 @ 13:19)  Culture Results:   No Growth Final (07-18 @ 13:12)  Culture Results:   No Growth Final (07-18 @ 13:12)        Culture - Blood (collected 07-19-21 @ 15:03)  Source: .Blood Blood  Preliminary Report (07-20-21 @ 16:01):    No growth to date.    Culture - Blood (collected 07-19-21 @ 15:03)  Source: .Blood Blood  Preliminary Report (07-20-21 @ 16:01):    No growth to date.    Culture - Urine (collected 07-18-21 @ 13:19)  Source: .Urine Clean Catch (Midstream)  Final Report (07-19-21 @ 11:43):    <10,000 CFU/mL Normal Urogenital Angelita    Culture - Blood (collected 07-18-21 @ 13:12)  Source: .Blood Blood-Peripheral  Final Report (07-23-21 @ 14:00):    No Growth Final    Culture - Blood (collected 07-18-21 @ 13:12)  Source: .Blood Blood-Peripheral  Final Report (07-23-21 @ 14:00):    No Growth Final        RADIOLOGY & ADDITIONAL TESTS:    Imaging Personally Reviewed:  [ ] YES  [ ] NO    Consultant(s) Notes Reviewed:  [ ] YES  [ ] NO    Care Discussed with Consultants/Other Providers [ x] YES  [ ] NO

## 2021-07-24 NOTE — PROGRESS NOTE ADULT - ASSESSMENT
Assessment :   60 y/o F with PMH of DM type 2, Dyslipidemia, Hypothyroidism, COVID PNA 4 months ago, Chronic Venous Insufficiency s/p right leg phlebectomy in April, Fibromyalgia, GERD, OA, and Morbid Obesity recent R TKA on July 12, 2021 by Dr Low admitted with worsening RLE cellulitis with lymphangitis  Likely Strep  Rash on back likely sec contact dermatitis- doubt allergic reaction to Ancef  Improving    Plan :   Cont Ancef day 4  Fu cultures--NGTD  Trend temps and cbc  Elevate leg  hydrocortisone prn for rash on back    Covering Dr. Jones  Infectious Diseases will continue to follow. Please call with any questions.   Pennie Will M.D.  [facility], Division of Infectious Diseases [phone]  For over the weekend and after hours, please call [phone]

## 2021-07-25 PROCEDURE — 99232 SBSQ HOSP IP/OBS MODERATE 35: CPT

## 2021-07-25 RX ORDER — ACETAMINOPHEN 500 MG
1000 TABLET ORAL EVERY 6 HOURS
Refills: 0 | Status: DISCONTINUED | OUTPATIENT
Start: 2021-07-25 | End: 2021-07-26

## 2021-07-25 RX ADMIN — Medication 40 MILLIGRAM(S): at 11:16

## 2021-07-25 RX ADMIN — Medication 125 MICROGRAM(S): at 06:27

## 2021-07-25 RX ADMIN — OXYCODONE HYDROCHLORIDE 5 MILLIGRAM(S): 5 TABLET ORAL at 12:01

## 2021-07-25 RX ADMIN — APIXABAN 5 MILLIGRAM(S): 2.5 TABLET, FILM COATED ORAL at 21:19

## 2021-07-25 RX ADMIN — Medication 1000 MILLIGRAM(S): at 15:16

## 2021-07-25 RX ADMIN — Medication 100 MILLIGRAM(S): at 21:22

## 2021-07-25 RX ADMIN — Medication 1000 MILLIGRAM(S): at 16:00

## 2021-07-25 RX ADMIN — Medication 3 MILLIGRAM(S): at 21:23

## 2021-07-25 RX ADMIN — ATORVASTATIN CALCIUM 40 MILLIGRAM(S): 80 TABLET, FILM COATED ORAL at 21:19

## 2021-07-25 RX ADMIN — OXYCODONE HYDROCHLORIDE 5 MILLIGRAM(S): 5 TABLET ORAL at 17:46

## 2021-07-25 RX ADMIN — Medication 100 MILLIGRAM(S): at 11:16

## 2021-07-25 RX ADMIN — Medication 100 MILLIGRAM(S): at 06:27

## 2021-07-25 RX ADMIN — SENNA PLUS 2 TABLET(S): 8.6 TABLET ORAL at 21:20

## 2021-07-25 RX ADMIN — PANTOPRAZOLE SODIUM 40 MILLIGRAM(S): 20 TABLET, DELAYED RELEASE ORAL at 06:27

## 2021-07-25 RX ADMIN — APIXABAN 5 MILLIGRAM(S): 2.5 TABLET, FILM COATED ORAL at 09:13

## 2021-07-25 RX ADMIN — OXYCODONE HYDROCHLORIDE 5 MILLIGRAM(S): 5 TABLET ORAL at 17:01

## 2021-07-25 RX ADMIN — LISINOPRIL 5 MILLIGRAM(S): 2.5 TABLET ORAL at 06:27

## 2021-07-25 RX ADMIN — OXYCODONE HYDROCHLORIDE 5 MILLIGRAM(S): 5 TABLET ORAL at 06:27

## 2021-07-25 RX ADMIN — OXYCODONE HYDROCHLORIDE 5 MILLIGRAM(S): 5 TABLET ORAL at 07:27

## 2021-07-25 RX ADMIN — OXYCODONE HYDROCHLORIDE 5 MILLIGRAM(S): 5 TABLET ORAL at 22:19

## 2021-07-25 RX ADMIN — OXYCODONE HYDROCHLORIDE 5 MILLIGRAM(S): 5 TABLET ORAL at 21:19

## 2021-07-25 RX ADMIN — Medication 50 MILLIGRAM(S): at 17:00

## 2021-07-25 RX ADMIN — Medication 100 MILLIGRAM(S): at 13:32

## 2021-07-25 RX ADMIN — OXYCODONE HYDROCHLORIDE 5 MILLIGRAM(S): 5 TABLET ORAL at 11:16

## 2021-07-25 NOTE — PROGRESS NOTE ADULT - SUBJECTIVE AND OBJECTIVE BOX
Patient is a 61y old  Female who presents with a chief complaint of RLE cellulitis (24 Jul 2021 12:46)      INTERVAL HPI/OVERNIGHT EVENTS:  Pt is seen and examined.  feels better.    Pain Location & Control:     MEDICATIONS  (STANDING):  apixaban 5 milliGRAM(s) Oral every 12 hours  atorvastatin 40 milliGRAM(s) Oral at bedtime  ceFAZolin   IVPB 2000 milliGRAM(s) IV Intermittent every 8 hours  FLUoxetine 40 milliGRAM(s) Oral daily  glucagon  Injectable 1 milliGRAM(s) IntraMuscular once  levothyroxine 125 MICROGram(s) Oral daily  lisinopril 5 milliGRAM(s) Oral daily  pantoprazole    Tablet 40 milliGRAM(s) Oral before breakfast  pregabalin 100 milliGRAM(s) Oral daily  pregabalin 50 milliGRAM(s) Oral <User Schedule>  senna 2 Tablet(s) Oral at bedtime    MEDICATIONS  (PRN):  acetaminophen   Tablet .. 650 milliGRAM(s) Oral every 6 hours PRN Mild Pain (1 - 3)  acetaminophen   Tablet .. 650 milliGRAM(s) Oral every 6 hours PRN Temp greater or equal to 38.5C (101.3F), Mild Pain (1 - 3)  aluminum hydroxide/magnesium hydroxide/simethicone Suspension 30 milliLiter(s) Oral every 4 hours PRN Dyspepsia  HYDROmorphone   Tablet 1 milliGRAM(s) Oral every 8 hours PRN Severe Pain (7 - 10)  melatonin 3 milliGRAM(s) Oral at bedtime PRN Insomnia  ondansetron Injectable 4 milliGRAM(s) IV Push every 8 hours PRN Nausea and/or Vomiting  oxyCODONE    IR 5 milliGRAM(s) Oral every 4 hours PRN Moderate Pain (4 - 6)      Allergies    No Known Allergies    Intolerances    Augmentin (Vomiting)          Vital Signs Last 24 Hrs  T(C): 36.7 (25 Jul 2021 10:15), Max: 37.1 (24 Jul 2021 21:26)  T(F): 98 (25 Jul 2021 10:15), Max: 98.7 (24 Jul 2021 21:26)  HR: 69 (25 Jul 2021 10:15) (63 - 71)  BP: 99/63 (25 Jul 2021 10:15) (99/63 - 127/81)  BP(mean): --  RR: 18 (25 Jul 2021 10:15) (17 - 18)  SpO2: 95% (25 Jul 2021 10:15) (95% - 97%)        LABS:      Ca    8.7        24 Jul 2021 07:22      CAPILLARY BLOOD GLUCOSE      Cultures  Culture Results:   No Growth Final (07-19 @ 15:03)  Culture Results:   No Growth Final (07-19 @ 15:03)  Culture Results:   <10,000 CFU/mL Normal Urogenital Angelita (07-18 @ 13:19)  Culture Results:   No Growth Final (07-18 @ 13:12)  Culture Results:   No Growth Final (07-18 @ 13:12)        Culture - Blood (collected 07-19-21 @ 15:03)  Source: .Blood Blood  Final Report (07-24-21 @ 15:04):    No Growth Final    Culture - Blood (collected 07-19-21 @ 15:03)  Source: .Blood Blood  Final Report (07-24-21 @ 15:04):    No Growth Final    Culture - Urine (collected 07-18-21 @ 13:19)  Source: .Urine Clean Catch (Midstream)  Final Report (07-19-21 @ 11:43):    <10,000 CFU/mL Normal Urogenital Angelita    Culture - Blood (collected 07-18-21 @ 13:12)  Source: .Blood Blood-Peripheral  Final Report (07-23-21 @ 14:00):    No Growth Final    Culture - Blood (collected 07-18-21 @ 13:12)  Source: .Blood Blood-Peripheral  Final Report (07-23-21 @ 14:00):    No Growth Final        RADIOLOGY & ADDITIONAL TESTS:    Imaging Personally Reviewed:  [ ] YES  [ ] NO    Consultant(s) Notes Reviewed:  [ ] YES  [ ] NO    Care Discussed with Consultants/Other Providers [x ] YES  [ ] NO

## 2021-07-25 NOTE — DIETITIAN INITIAL EVALUATION ADULT. - PERTINENT MEDS FT
ancef, dilaudid, lipitor, prozac, synthyroid, lisinopril, zofran, oxycodone, protonix, lyrica, senna,

## 2021-07-25 NOTE — DIETITIAN INITIAL EVALUATION ADULT. - OTHER INFO
62 y/o F with PMH of DM type 2, Dyslipidemia, Hypothyroidism, COVID PNA 4 months ago, Chronic Venous Insufficiency s/p right leg phlebectomy in April, Fibromyalgia, GERD, OA, and Morbid Obesity recent R TKA and  admitted with worsening RLE cellulitis with lymphangitis. Pt has +1 edema b/l feet. Pt on metformin but not for DM: Pt had emergency hysterectomy and was then put on it. Diet was changed from consistent carbohydrate to regular. Pt aware that she has Pre Dm 6.4%. Pt appears knowledgeable about balanced meals, appropriate portions and she sees an endocrinologist for Pre Dm as well as hypothyroidism.. Pt admits to recent weight gain which she attributes to knee problems. Pt reported that she used to be very athletic in the past. Reviewed basic nutritional strategies to help promote wt loss/glycemic control. Pt receptive to teaching and will f/u with endocrinologist after discharge.

## 2021-07-25 NOTE — PROGRESS NOTE ADULT - ASSESSMENT
his is a 60 y/o F with PMH of DM type 2, Dyslipidemia, Hypothyroidism, COVID PNA 4 months ago, Chronic Venous Insufficiency s/p right leg phlebectomy in April, Fibromyalgia, GERD, OA, and Morbid Obesity who presented for RLE cellulitis.     RLE cellulitis:   On Ancef Day 6  wbc stable. Afebrile  f/u culture results   ID Dr Robert barnett     R TKA: performed on July 12, 2021  -surgical site does not appear to be infected.   -orthopedics consulted Dr Low.   -continue Eliquis 5mg q12 h [dose was confirmed with patient and increased dose was chosen due to patients increased risk for VTE]    -Pre diabetic Hgba1c of 6.4.   Pt states that she takes Metformin for other hormonal issues. Pt does not check her fingersticks at home. Pt used to follow with a Dr Cammy Mera. Pt is requesting a regular diet and does not want to be on a diabetic diet. Pt to follow up outpatient with her PCP.     Anemia:   no signs or symptoms of active bleeding. Continue to monitor hgb.     Depression and Fibromyalgia:   continue fluoxetine    Hypothyroidism:   continue synthroid.     HLD:   continue Lipitor    HTN:   continue Lisinopril    GERD:   continue Protonix    DVT ppx: continue Eliquis    Dispo: Discharge plan pending hospital course.    Plan of care was discuss with patient, all questions were answered, seems understand and in agreement.

## 2021-07-25 NOTE — PROGRESS NOTE ADULT - ASSESSMENT
Assessment :   60 y/o F with PMH of DM type 2, Dyslipidemia, Hypothyroidism, COVID PNA 4 months ago, Chronic Venous Insufficiency s/p right leg phlebectomy in April, Fibromyalgia, GERD, OA, and Morbid Obesity recent R TKA on July 12, 2021 by Dr Low admitted with worsening RLE cellulitis with lymphangitis  Likely Strep  Rash on back likely sec contact dermatitis- doubt allergic reaction to Ancef  Improving    Plan :   Cont Ancef day 4  Fu cultures--NGTD  Trend temps and cbc  Elevate leg  hydrocortisone prn for rash on back    Covering Dr. Jones  Infectious Diseases will continue to follow. Please call with any questions.   Pennie Will M.D.  [facility], Division of Infectious Diseases [phone]  For over the weekend and after hours, please call [phone]

## 2021-07-25 NOTE — PROGRESS NOTE ADULT - SUBJECTIVE AND OBJECTIVE BOX
[facility], Division of Infectious Diseases  ROSANA Madera Y. Patel, S. Shah  [phone]    Name: MANJEET YEBOAH  Age: 61y  Gender: Female  MRN: [id number]    Interval History:  Patient seen and examined at bedside this morning  No acute overnight events. Afebrile  Pleasant mood  Still feeling itchy but not bothersome  Improving RLE exam  Notes reviewed    Antibiotics:  ceFAZolin   IVPB 2000 milliGRAM(s) IV Intermittent every 8 hours      Medications:  acetaminophen   Tablet .. 650 milliGRAM(s) Oral every 6 hours PRN  acetaminophen   Tablet .. 650 milliGRAM(s) Oral every 6 hours PRN  aluminum hydroxide/magnesium hydroxide/simethicone Suspension 30 milliLiter(s) Oral every 4 hours PRN  apixaban 5 milliGRAM(s) Oral every 12 hours  atorvastatin 40 milliGRAM(s) Oral at bedtime  ceFAZolin   IVPB 2000 milliGRAM(s) IV Intermittent every 8 hours  FLUoxetine 40 milliGRAM(s) Oral daily  glucagon  Injectable 1 milliGRAM(s) IntraMuscular once  HYDROmorphone   Tablet 1 milliGRAM(s) Oral every 8 hours PRN  levothyroxine 125 MICROGram(s) Oral daily  lisinopril 5 milliGRAM(s) Oral daily  melatonin 3 milliGRAM(s) Oral at bedtime PRN  ondansetron Injectable 4 milliGRAM(s) IV Push every 8 hours PRN  oxyCODONE    IR 5 milliGRAM(s) Oral every 4 hours PRN  pantoprazole    Tablet 40 milliGRAM(s) Oral before breakfast  pregabalin 100 milliGRAM(s) Oral daily  pregabalin 50 milliGRAM(s) Oral <User Schedule>  senna 2 Tablet(s) Oral at bedtime      Review of Systems:  A 10-point review of systems was obtained.   Review of systems otherwise negative except as previously noted.    Allergies: No Known Allergies    For details regarding the patient's past medical history, social history, family history, and other miscellaneous elements, please refer the initial infectious diseases consultation and/or the admitting history and physical examination for this admission.    Objective:  Vitals:   T(C): 36.7 (07-25-21 @ 10:15), Max: 37.1 (07-24-21 @ 21:26)  HR: 69 (07-25-21 @ 10:15) (63 - 71)  BP: 99/63 (07-25-21 @ 10:15) (99/63 - 127/81)  RR: 18 (07-25-21 @ 10:15) (17 - 18)  SpO2: 95% (07-25-21 @ 10:15) (95% - 97%)    Physical Examination:  General: no acute distress  HEENT: NC/AT, EOMI, anicteric, no oral lesions  Neck: supple, no palpable LAD  Cardio: S1, S2 heard, RRR, no murmurs  Resp: breath sounds heard bilaterally, no rales, wheezes or rhonchi  Abd: soft, NT, ND, + bowel sounds  Neuro: no obvious focal deficits  Ext: RLE cellulitis, improving exam. No purulence, receding erythema and improving warmth  Skin: warm, dry, no visible rash      Laboratory Studies:  CBC:                       10.4   10.86 )-----------( 333      ( 24 Jul 2021 07:22 )             33.2     CMP: 07-24    139  |  103  |  12  ----------------------------<  110<H>  4.3   |  28  |  0.79    Ca    8.7      24 Jul 2021 07:22            Microbiology: reviewed    Culture - Blood (collected 07-19-21 @ 15:03)  Source: .Blood Blood  Final Report (07-24-21 @ 15:04):    No Growth Final    Culture - Blood (collected 07-19-21 @ 15:03)  Source: .Blood Blood  Final Report (07-24-21 @ 15:04):    No Growth Final    Culture - Urine (collected 07-18-21 @ 13:19)  Source: .Urine Clean Catch (Midstream)  Final Report (07-19-21 @ 11:43):    <10,000 CFU/mL Normal Urogenital Angelita    Culture - Blood (collected 07-18-21 @ 13:12)  Source: .Blood Blood-Peripheral  Final Report (07-23-21 @ 14:00):    No Growth Final    Culture - Blood (collected 07-18-21 @ 13:12)  Source: .Blood Blood-Peripheral  Final Report (07-23-21 @ 14:00):    No Growth Final    Culture - Body Fluid with Gram Stain (collected 07-12-21 @ 22:14)  Source: .Body Fluid Joint Fluid  Gram Stain (07-13-21 @ 02:33):    No polymorphonuclear cells seen    No organisms seen    by cytocentrifuge  Preliminary Report (07-17-21 @ 17:28):    No growth at 5 days        Radiology: reviewed

## 2021-07-26 ENCOUNTER — TRANSCRIPTION ENCOUNTER (OUTPATIENT)
Age: 61
End: 2021-07-26

## 2021-07-26 VITALS
RESPIRATION RATE: 18 BRPM | SYSTOLIC BLOOD PRESSURE: 140 MMHG | HEART RATE: 74 BPM | DIASTOLIC BLOOD PRESSURE: 64 MMHG | TEMPERATURE: 98 F | OXYGEN SATURATION: 98 %

## 2021-07-26 LAB
CULTURE RESULTS: SIGNIFICANT CHANGE UP
SARS-COV-2 RNA SPEC QL NAA+PROBE: SIGNIFICANT CHANGE UP
SPECIMEN SOURCE: SIGNIFICANT CHANGE UP

## 2021-07-26 PROCEDURE — 80048 BASIC METABOLIC PNL TOTAL CA: CPT

## 2021-07-26 PROCEDURE — 86140 C-REACTIVE PROTEIN: CPT

## 2021-07-26 PROCEDURE — 87641 MR-STAPH DNA AMP PROBE: CPT

## 2021-07-26 PROCEDURE — 93005 ELECTROCARDIOGRAM TRACING: CPT

## 2021-07-26 PROCEDURE — 80053 COMPREHEN METABOLIC PANEL: CPT

## 2021-07-26 PROCEDURE — 86803 HEPATITIS C AB TEST: CPT

## 2021-07-26 PROCEDURE — 85652 RBC SED RATE AUTOMATED: CPT

## 2021-07-26 PROCEDURE — U0003: CPT

## 2021-07-26 PROCEDURE — 71045 X-RAY EXAM CHEST 1 VIEW: CPT

## 2021-07-26 PROCEDURE — 99285 EMERGENCY DEPT VISIT HI MDM: CPT

## 2021-07-26 PROCEDURE — 87040 BLOOD CULTURE FOR BACTERIA: CPT

## 2021-07-26 PROCEDURE — 83605 ASSAY OF LACTIC ACID: CPT

## 2021-07-26 PROCEDURE — 87640 STAPH A DNA AMP PROBE: CPT

## 2021-07-26 PROCEDURE — 99239 HOSP IP/OBS DSCHRG MGMT >30: CPT

## 2021-07-26 PROCEDURE — 85027 COMPLETE CBC AUTOMATED: CPT

## 2021-07-26 PROCEDURE — 86769 SARS-COV-2 COVID-19 ANTIBODY: CPT

## 2021-07-26 PROCEDURE — 85025 COMPLETE CBC W/AUTO DIFF WBC: CPT

## 2021-07-26 PROCEDURE — 87635 SARS-COV-2 COVID-19 AMP PRB: CPT

## 2021-07-26 PROCEDURE — U0005: CPT

## 2021-07-26 PROCEDURE — 36415 COLL VENOUS BLD VENIPUNCTURE: CPT

## 2021-07-26 PROCEDURE — 97161 PT EVAL LOW COMPLEX 20 MIN: CPT

## 2021-07-26 RX ORDER — OXYCODONE HYDROCHLORIDE 5 MG/1
1 TABLET ORAL
Qty: 0 | Refills: 0 | DISCHARGE
Start: 2021-07-26

## 2021-07-26 RX ORDER — CEPHALEXIN 500 MG
500 CAPSULE ORAL
Refills: 0 | Status: DISCONTINUED | OUTPATIENT
Start: 2021-07-26 | End: 2021-07-26

## 2021-07-26 RX ORDER — CEPHALEXIN 500 MG
1 CAPSULE ORAL
Qty: 28 | Refills: 0
Start: 2021-07-26 | End: 2021-08-01

## 2021-07-26 RX ADMIN — PANTOPRAZOLE SODIUM 40 MILLIGRAM(S): 20 TABLET, DELAYED RELEASE ORAL at 05:23

## 2021-07-26 RX ADMIN — OXYCODONE HYDROCHLORIDE 5 MILLIGRAM(S): 5 TABLET ORAL at 10:48

## 2021-07-26 RX ADMIN — LISINOPRIL 5 MILLIGRAM(S): 2.5 TABLET ORAL at 05:23

## 2021-07-26 RX ADMIN — Medication 100 MILLIGRAM(S): at 05:23

## 2021-07-26 RX ADMIN — OXYCODONE HYDROCHLORIDE 5 MILLIGRAM(S): 5 TABLET ORAL at 06:23

## 2021-07-26 RX ADMIN — Medication 40 MILLIGRAM(S): at 11:04

## 2021-07-26 RX ADMIN — Medication 1000 MILLIGRAM(S): at 09:27

## 2021-07-26 RX ADMIN — Medication 500 MILLIGRAM(S): at 12:47

## 2021-07-26 RX ADMIN — Medication 100 MILLIGRAM(S): at 11:04

## 2021-07-26 RX ADMIN — OXYCODONE HYDROCHLORIDE 5 MILLIGRAM(S): 5 TABLET ORAL at 05:23

## 2021-07-26 RX ADMIN — APIXABAN 5 MILLIGRAM(S): 2.5 TABLET, FILM COATED ORAL at 09:27

## 2021-07-26 RX ADMIN — Medication 125 MICROGRAM(S): at 05:23

## 2021-07-26 NOTE — DISCHARGE NOTE PROVIDER - CARE PROVIDERS DIRECT ADDRESSES
,DirectAddress_Unknown,[email or web address]

## 2021-07-26 NOTE — PROGRESS NOTE ADULT - PROVIDER SPECIALTY LIST ADULT
Hospitalist
Infectious Disease
Orthopedics
Infectious Disease
Infectious Disease
Hospitalist
Hospitalist
Infectious Disease
Infectious Disease
Orthopedics
Infectious Disease
Orthopedics
Hospitalist
Infectious Disease
Hospitalist
Hospitalist

## 2021-07-26 NOTE — DISCHARGE NOTE PROVIDER - NSDCMRMEDTOKEN_GEN_ALL_CORE_FT
acetaminophen 500 mg oral tablet: 2 tab(s) orally every 8 hours  apixaban 5 mg oral tablet: 1 tab(s) orally every 12 hours  atorvastatin 40 mg oral tablet: 1 tab(s) orally once a day  celecoxib 100 mg oral capsule: 1 cap orally every 12 hours.  cephalexin 500 mg oral tablet: 1 tab(s) orally 4 times a day   FLUoxetine 40 mg oral capsule: 1 cap(s) orally once a day  levothyroxine 125 mcg (0.125 mg) oral tablet: 1 tab(s) orally once a day  lisinopril 5 mg oral tablet: 1 tab(s) orally once a day  Lyrica 50 mg oral capsule: 1 cap(s) orally 3 times a day  2 in am and 1 in pm  metFORMIN 750 mg oral tablet, extended release: 1 tab(s) orally once a day  omeprazole 40 mg oral delayed release capsule: 1 cap(s) orally once a day  oxyCODONE 5 mg oral tablet: 1 or 2 tab(s) orally every 4 hours, As Needed -Moderate to Severe Pain (4 - 6) MDD:6    [place] [facility] ref#[id number]  senna oral tablet: 2 tab(s) orally once a day (at bedtime)   acetaminophen 500 mg oral tablet: 2 tab(s) orally every 8 hours  apixaban 5 mg oral tablet: 1 tab(s) orally every 12 hours  atorvastatin 40 mg oral tablet: 1 tab(s) orally once a day  celecoxib 100 mg oral capsule: 1 cap orally every 12 hours.  cephalexin 500 mg oral capsule: 1 cap(s) orally 4 times a day  FLUoxetine 40 mg oral capsule: 1 cap(s) orally once a day  levothyroxine 125 mcg (0.125 mg) oral tablet: 1 tab(s) orally once a day  lisinopril 5 mg oral tablet: 1 tab(s) orally once a day  Lyrica 50 mg oral capsule: 1 cap(s) orally 3 times a day  2 in am and 1 in pm  metFORMIN 750 mg oral tablet, extended release: 1 tab(s) orally once a day  omeprazole 40 mg oral delayed release capsule: 1 cap(s) orally once a day  oxyCODONE 5 mg oral tablet: 1 tab(s) orally every 4 hours, As needed, Moderate Pain (4 - 6)  oxyCODONE 5 mg oral tablet: 1 or 2 tab(s) orally every 4 hours, As Needed -Moderate to Severe Pain (4 - 6) MDD:6    [place] [facility] ref#[id number]  senna oral tablet: 2 tab(s) orally once a day (at bedtime)   acetaminophen 500 mg oral tablet: 2 tab(s) orally every 8 hours  apixaban 5 mg oral tablet: 1 tab(s) orally every 12 hours  atorvastatin 40 mg oral tablet: 1 tab(s) orally once a day  celecoxib 100 mg oral capsule: 1 cap orally every 12 hours.  cephalexin 500 mg oral capsule: 1 cap(s) orally 4 times a day  FLUoxetine 40 mg oral capsule: 1 cap(s) orally once a day  Home PT evaluation.:   levothyroxine 125 mcg (0.125 mg) oral tablet: 1 tab(s) orally once a day  lisinopril 5 mg oral tablet: 1 tab(s) orally once a day  Lyrica 50 mg oral capsule: 1 cap(s) orally 3 times a day  2 in am and 1 in pm  metFORMIN 750 mg oral tablet, extended release: 1 tab(s) orally once a day  omeprazole 40 mg oral delayed release capsule: 1 cap(s) orally once a day  oxyCODONE 5 mg oral tablet: 1 tab(s) orally every 4 hours, As needed, Moderate Pain (4 - 6)  oxyCODONE 5 mg oral tablet: 1 or 2 tab(s) orally every 4 hours, As Needed -Moderate to Severe Pain (4 - 6) MDD:6    [place]  ref#[id number]  senna oral tablet: 2 tab(s) orally once a day (at bedtime)

## 2021-07-26 NOTE — DISCHARGE NOTE PROVIDER - CARE PROVIDER_API CALL
KATHARINA SIMS  Internal Medicine  [address] [place] [address]  Phone: [phone]  Fax: [phone]  Follow Up Time:     Erickson Low)  Orthopedics  [address] [facility] [address]  [place] [address]  Phone: [phone]  Fax: [phone]  Follow Up Time:

## 2021-07-26 NOTE — PROGRESS NOTE ADULT - REASON FOR ADMISSION
RLE cellulitis
Right lower extremity cellulitis
RLE cellulitis

## 2021-07-26 NOTE — DISCHARGE NOTE PROVIDER - NSDCCPCAREPLAN_GEN_ALL_CORE_FT
PRINCIPAL DISCHARGE DIAGNOSIS  Diagnosis: Cellulitis of right lower extremity  Assessment and Plan of Treatment: RLE cellulitis:   s/p  Ancef   on keflex po.  R TKA: performed on July 12, 2021  -surgical site does not appear to be infected.   -orthopedics consulted Dr Low.   -continue Eliquis 5mg q12 h [dose was confirmed with patient and increased dose was chosen due to patients increased risk for VTE]  -Pre diabetic Hgba1c of 6.4.   Pt states that she takes Metformin for other hormonal issues. Pt does not check her fingersticks at home. Pt used to follow with a Dr Cammy Mera. Pt is requesting a regular diet and does not want to be on a diabetic diet. Pt to follow up outpatient with her PCP.   Anemia:   no signs or symptoms of active bleeding. Continue to monitor hgb.   Depression and Fibromyalgia:   continue fluoxetine  Hypothyroidism:   continue synthroid.   HLD:   continue Lipitor  HTN:   continue Lisinopril         PRINCIPAL DISCHARGE DIAGNOSIS  Diagnosis: Cellulitis of right lower extremity  Assessment and Plan of Treatment: RLE cellulitis:   s/p  Ancef   on keflex pox 7 days.  R TKA: performed on July 12, 2021  -surgical site does not appear to be infected.   -orthopedics consulted Dr Low.   -continue Eliquis 5mg q12 h [dose was confirmed with patient and increased dose was chosen due to patients increased risk for VTE]  -Pre diabetic Hgba1c of 6.4.   Pt states that she takes Metformin for other hormonal issues. Pt does not check her fingersticks at home. Pt used to follow with a Dr Cammy Mera. Pt is requesting a regular diet and does not want to be on a diabetic diet. Pt to follow up outpatient with her PCP.   Anemia:   no signs or symptoms of active bleeding. Continue to monitor hgb.   Depression and Fibromyalgia:   continue fluoxetine  Hypothyroidism:   continue synthroid.   HLD:   continue Lipitor  HTN:   continue Lisinopril

## 2021-07-26 NOTE — DISCHARGE NOTE PROVIDER - HOSPITAL COURSE
62 y/o F with PMH of DM type 2, Dyslipidemia, Hypothyroidism, COVID PNA 4 months ago, Chronic Venous Insufficiency s/p right leg phlebectomy in April, Fibromyalgia, GERD, OA, and Morbid Obesity who presented for RLE cellulitis.     RLE cellulitis:   s/p  Ancef   on keflex po.      R TKA: performed on July 12, 2021  -surgical site does not appear to be infected.   -orthopedics consulted Dr Low.   -continue Eliquis 5mg q12 h [dose was confirmed with patient and increased dose was chosen due to patients increased risk for VTE]    -Pre diabetic Hgba1c of 6.4.   Pt states that she takes Metformin for other hormonal issues. Pt does not check her fingersticks at home. Pt used to follow with a Dr Cammy Mera. Pt is requesting a regular diet and does not want to be on a diabetic diet. Pt to follow up outpatient with her PCP.     Anemia:   no signs or symptoms of active bleeding. Continue to monitor hgb.     Depression and Fibromyalgia:   continue fluoxetine    Hypothyroidism:   continue synthroid.     HLD:   continue Lipitor    HTN:   continue Lisinopril      T(C): 36.7 (07-26-21 @ 10:00), Max: 36.8 (07-25-21 @ 21:49)  HR: 66 (07-26-21 @ 10:00) (57 - 74)  BP: 118/77 (07-26-21 @ 10:00) (102/66 - 138/85)  RR: 17 (07-26-21 @ 10:00) (17 - 18)  SpO2: 99% (07-26-21 @ 10:00) (95% - 99%)  Wt(kg): --  REVIEW OF SYSTEMS:    CONSTITUTIONAL: No weakness, fevers or chills  EYES/ENT: No visual changes;  No vertigo or throat pain   NECK: No pain or stiffness  RESPIRATORY: No cough, wheezing, hemoptysis; No shortness of breath  CARDIOVASCULAR: No chest pain or palpitations  GASTROINTESTINAL: No abdominal or epigastric pain. No nausea, vomiting, or hematemesis; No diarrhea or constipation. No melena or hematochezia.  GENITOURINARY: No dysuria, frequency or hematuria  NEUROLOGICAL: No numbness or weakness  SKIN: No itching, burning, rashes, or lesions   All other review of systems is negative unless indicated above.  PHYSICAL EXAM:  GENERAL: NAD, well-groomed, well-developed  HEAD:  Atraumatic, Normocephalic  EYES: EOMI, PERRLA, conjunctiva and sclera clear  ENMT: No tonsillar erythema, exudates, or enlargement; Moist mucous membranes, Good dentition, No lesions  NECK: Supple, No JVD, Normal thyroid  NERVOUS SYSTEM:  Alert & Oriented X3, Good concentration; Motor Strength 5/5 B/L upper and lower extremities; DTRs 2+ intact and symmetric  CHEST/LUNG: Clear to auscultation bilaterally; No rales, rhonchi, wheezing, or rubs  HEART: Regular rate and rhythm; No murmurs, rubs, or gallops  ABDOMEN: Soft, Nontender, Nondistended; Bowel sounds present  EXTREMITIES:  2+ Peripheral Pulses, No clubbing or cyanosis +redness and swelling of LLE- improving.  LYMPH: No lymphadenopathy noted  SKIN: No rashes or lesions  INCISION:  Dressing dry and intact    Time Spent 60 minutes.         60 y/o F with PMH of DM type 2, Dyslipidemia, Hypothyroidism, COVID PNA 4 months ago, Chronic Venous Insufficiency s/p right leg phlebectomy in April, Fibromyalgia, GERD, OA, and Morbid Obesity who presented for RLE cellulitis.     RLE cellulitis:   s/p  Ancef   on keflex po.      R TKA: performed on July 12, 2021  -surgical site does not appear to be infected.   -orthopedics consulted Dr Low.   -continue Eliquis 5mg q12 h [dose was confirmed with patient and increased dose was chosen due to patients increased risk for VTE]    -Pre diabetic Hgba1c of 6.4.   Pt states that she takes Metformin for other hormonal issues. Pt does not check her fingersticks at home. Pt used to follow with a Dr Cammy Mera. Pt is requesting a regular diet and does not want to be on a diabetic diet. Pt to follow up outpatient with her PCP.     Anemia:   no signs or symptoms of active bleeding. Continue to monitor hgb.     Depression and Fibromyalgia:   continue fluoxetine    Hypothyroidism:   continue synthroid.     HLD:   continue Lipitor    HTN:   continue Lisinopril      T(C): 36.7 (07-26-21 @ 10:00), Max: 36.8 (07-25-21 @ 21:49)  HR: 66 (07-26-21 @ 10:00) (57 - 74)  BP: 118/77 (07-26-21 @ 10:00) (102/66 - 138/85)  RR: 17 (07-26-21 @ 10:00) (17 - 18)  SpO2: 99% (07-26-21 @ 10:00) (95% - 99%)  Wt(kg): --  REVIEW OF SYSTEMS:    CONSTITUTIONAL: No weakness, fevers or chills  EYES/ENT: No visual changes;  No vertigo or throat pain   NECK: No pain or stiffness  RESPIRATORY: No cough, wheezing, hemoptysis; No shortness of breath  CARDIOVASCULAR: No chest pain or palpitations  GASTROINTESTINAL: No abdominal or epigastric pain. No nausea, vomiting, or hematemesis; No diarrhea or constipation. No melena or hematochezia.  GENITOURINARY: No dysuria, frequency or hematuria  NEUROLOGICAL: No numbness or weakness  SKIN: No itching, burning, rashes, or lesions   All other review of systems is negative unless indicated above.  PHYSICAL EXAM:  GENERAL: NAD, well-groomed, well-developed  HEAD:  Atraumatic, Normocephalic  EYES: EOMI, PERRLA, conjunctiva and sclera clear  ENMT: No tonsillar erythema, exudates, or enlargement; Moist mucous membranes, Good dentition, No lesions  NECK: Supple, No JVD, Normal thyroid  NERVOUS SYSTEM:  Alert & Oriented X3, Good concentration; Motor Strength 5/5 B/L upper and lower extremities; DTRs 2+ intact and symmetric  CHEST/LUNG: Clear to auscultation bilaterally; No rales, rhonchi, wheezing, or rubs  HEART: Regular rate and rhythm; No murmurs, rubs, or gallops  ABDOMEN: Soft, Nontender, Nondistended; Bowel sounds present  EXTREMITIES:  2+ Peripheral Pulses, No clubbing or cyanosis +redness and swelling of LLE- improving.  LYMPH: No lymphadenopathy noted  SKIN: No rashes or lesions  INCISION:  Dressing dry and intact    Time Spent 60 minutes.  PMD I attempted to call PMD, operators are busy.

## 2021-07-26 NOTE — PROGRESS NOTE ADULT - SUBJECTIVE AND OBJECTIVE BOX
ORTHOPEDIC PA PROGRESS NOTE  MANJEET YEBOAH      61y Female                                                                                                                               Admitted 7/19 for right leg cellulitis.  Right leg TKR done 7/12        Pain (0-10): 3   Current Pain Management:  [ ] PCA   [x] Po Analgesics [ ] IM /IV Anagesics     T(F): 97.4  HR: 69  BP: 138/85  RR: 18  SpO2: 97%                          Physical Exam   -   Wound C/D/I.   -   Distal Neurvascular status intact grossly.   -   Warm well perfused; capillary refill <3 seconds   -   (+)EHL/FHL 5/5  -   (+) Sensation to light touch  -   (-) Calf tenderness Bilaterally  -  Lower leg cellulitis is improving, patient is complaining of less pain    A/P: 61y Female s/p   -   Ortho Stable  -   Pain control   -   Medicine to follow  -   DVT ppx:     [x]SCDs     [ ] ASA     [x ] Eliquis     [ ] Lovenox  -   Weight bearing status:  WBAT [x ]        PWB    [ ]     TTWB  [ ]      NWB  [ ]   -  Dispo:     Home [ ]     Acute Rehab [ ]     FREDI [ ]     TBD [x ]  -  ID input appreciated.  Cellulitis improving on Ancef.  As per Dr. Low patient may do physical therapy

## 2021-07-26 NOTE — PHYSICAL THERAPY INITIAL EVALUATION ADULT - RANGE OF MOTION EXAMINATION, REHAB EVAL
right knee flex ~80 degrees/bilateral upper extremity ROM was WFL (within functional limits)/Left LE ROM was WFL (within functional limits)/deficits as listed below

## 2021-07-26 NOTE — PROGRESS NOTE ADULT - NSICDXPILOT_GEN_ALL_CORE
[place]

## 2021-07-26 NOTE — PROGRESS NOTE ADULT - SUBJECTIVE AND OBJECTIVE BOX
MANJEET YEBOAH is a 61yFemale , patient examined and chart reviewed.     INTERVAL HPI/ OVERNIGHT EVENTS:   No events. NAD  Afebrile.    PAST MEDICAL & SURGICAL HISTORY:  Pneumonia due to COVID-19 virus  3/2021  inpatient at [facility]  Type 2 diabetes mellitus  Hypothyroid  Acid reflux  Venous insufficiency of leg  History of varicose veins  History of fibromyalgia  Osteoarthritis  Uterine hemorrhage  Severe obesity (BMI &gt;= 40)  H/O:   H/O: hysterectomy  S/P FESS (functional endoscopic sinus surgery)  2007  History of myringotomy  2006  Varicose vein of leg  right leg, excised 2021 thigh to knee  History of total knee replacement  bilateral 2005  History of revision of total replacement of knee joint  right 21      For details regarding the patient's social history, family history, and other miscellaneous elements, please refer the initial infectious diseases consultation and/or the admitting history and physical examination for this admission.    ROS:  CONSTITUTIONAL:  Negative fever or chills  EYES:  Negative  blurry vision or double vision  CARDIOVASCULAR:  Negative for chest pain or palpitations  RESPIRATORY:  Negative for cough, wheezing, or SOB   GASTROINTESTINAL:  Negative for nausea, vomiting, diarrhea, constipation, or abdominal pain  GENITOURINARY:  Negative frequency, urgency or dysuria  NEUROLOGIC:  No headache, confusion, dizziness, lightheadedness  All other systems were reviewed and are negative     ALLERGIES  Augmentin (Vomiting)      Current inpatient medications :    ANTIBIOTICS/RELEVANT:  ceFAZolin   IVPB 2000 milliGRAM(s) IV Intermittent every 8 hours    MEDICATIONS  (STANDING):  apixaban 5 milliGRAM(s) Oral every 12 hours  atorvastatin 40 milliGRAM(s) Oral at bedtime  FLUoxetine 40 milliGRAM(s) Oral daily  glucagon  Injectable 1 milliGRAM(s) IntraMuscular once  levothyroxine 125 MICROGram(s) Oral daily  lisinopril 5 milliGRAM(s) Oral daily  pantoprazole    Tablet 40 milliGRAM(s) Oral before breakfast  pregabalin 50 milliGRAM(s) Oral <User Schedule>  senna 2 Tablet(s) Oral at bedtime    MEDICATIONS  (PRN):  acetaminophen   Tablet .. 650 milliGRAM(s) Oral every 6 hours PRN Temp greater or equal to 38.5C (101.3F), Mild Pain (1 - 3)  acetaminophen   Tablet .. 1000 milliGRAM(s) Oral every 6 hours PRN Moderate Pain (4 - 6)  aluminum hydroxide/magnesium hydroxide/simethicone Suspension 30 milliLiter(s) Oral every 4 hours PRN Dyspepsia  HYDROmorphone   Tablet 1 milliGRAM(s) Oral every 8 hours PRN Severe Pain (7 - 10)  melatonin 3 milliGRAM(s) Oral at bedtime PRN Insomnia  ondansetron Injectable 4 milliGRAM(s) IV Push every 8 hours PRN Nausea and/or Vomiting  oxyCODONE    IR 5 milliGRAM(s) Oral every 4 hours PRN Moderate Pain (4 - 6)      Objective:  Vital Signs Last 24 Hrs  T(C): 36.7 (2021 10:00), Max: 36.8 (2021 21:49)  T(F): 98.1 (2021 10:00), Max: 98.3 (2021 21:49)  HR: 66 (2021 10:00) (57 - 74)  BP: 118/77 (2021 10:00) (102/66 - 138/85)  RR: 17 (2021 10:00) (17 - 18)  SpO2: 99% (2021 10:00) (95% - 99%)    Physical Exam:  General: Obese no acute distress  Neck: supple, trachea midline  Lungs: clear, no wheeze/rhonchi  Cardiovascular: regular rate and rhythm, S1 S2  Abdomen: soft, nontender,  bowel sounds normal  Neurological: alert and oriented x3  Skin: Rash on back  Extremities: Right leg erythema  much improved  right knee surgical site c/d/i      LABS:    MICROBIOLOGY:    Culture - Blood (collected 2021 15:03)  Source: .Blood Blood  Preliminary Report (2021 16:01):    No growth to date.    Culture - Blood (collected 2021 15:03)  Source: .Blood Blood  Preliminary Report (2021 16:01):    No growth to date.    Culture - Urine (collected 2021 13:19)  Source: .Urine Clean Catch (Midstream)  Final Report (2021 11:43):    <10,000 CFU/mL Normal Urogenital Angelita    Culture - Blood (collected 2021 13:12)  Source: .Blood Blood-Peripheral  Preliminary Report (2021 14:01):    No growth to date.    Culture - Blood (collected 2021 13:12)  Source: .Blood Blood-Peripheral  Preliminary Report (2021 14:01):    No growth to date.      RADIOLOGY & ADDITIONAL STUDIES:  EXAM:  US DPLX LWR EXT VEINS LTD RT                                  PROCEDURE DATE:  2021          INTERPRETATION:  CLINICAL INFORMATION: Right lower extremity pain.    COMPARISON: None available.    TECHNIQUE: Duplex sonography of the RIGHTLOWER extremity veins with color and spectral Doppler, with and without compression.    FINDINGS:    There is normal compressibility of the right common femoral, femoral and popliteal veins.  The contralateral common femoral vein is patent.  Doppler examination shows normal spontaneous and phasic flow.    No calf vein thrombosis is detected, although the peroneal vein was not visualized.    There is subcutaneous edema in the calf.    IMPRESSION:  No evidence of right lower extremity deep venous thrombosis.  Suboptimal evaluation of the calf veins.    Assessment :   60 y/o F with PMH of DM type 2, Dyslipidemia, Hypothyroidism, COVID PNA 4 months ago, Chronic Venous Insufficiency s/p right leg phlebectomy in April, Fibromyalgia, GERD, OA, and Morbid Obesity recent R TKA on 2021 by Dr Low admitted with worsening RLE cellulitis with lymphangitis  Likely Strep  Rash on back likely sec contact dermatitis- doubt allergic reaction to Ancef  Clinically improving    Plan :   On Ancef day 7  Change to po Keflex 500mg po q6h x 7 days  Trend temps and cbc  Elevate leg  Fu ortho outpt  Stable from ID stabdpoint for dc    D/w Dr PEPE Bowens        Continue with present regiment.  Appropriate use of antibiotics and adverse effects reviewed.      I have discussed the above plan of care with patient in detail. She expressed understanding of the  treatment plan . Risks, benefits and alternatives discussed in detail. I have asked if she has any questions or concerns and appropriately addressed them to the best of my ability .    > 35 minutes were spent in direct patient care reviewing notes, medications ,labs data/ imaging , discussion with multidisciplinary team.    Thank you for allowing me to participate in care of your patient .    Berenice Jones MD  Infectious Disease  [phone]

## 2021-08-03 ENCOUNTER — APPOINTMENT (OUTPATIENT)
Dept: ORTHOPEDIC SURGERY | Facility: CLINIC | Age: 61
End: 2021-08-03
Payer: MEDICARE

## 2021-08-03 VITALS — HEART RATE: 60 BPM | DIASTOLIC BLOOD PRESSURE: 78 MMHG | SYSTOLIC BLOOD PRESSURE: 133 MMHG

## 2021-08-03 PROCEDURE — 99024 POSTOP FOLLOW-UP VISIT: CPT

## 2021-08-03 PROCEDURE — 73562 X-RAY EXAM OF KNEE 3: CPT | Mod: RT

## 2021-08-05 ENCOUNTER — NON-APPOINTMENT (OUTPATIENT)
Age: 61
End: 2021-08-05

## 2021-08-06 ENCOUNTER — NON-APPOINTMENT (OUTPATIENT)
Age: 61
End: 2021-08-06

## 2021-08-10 ENCOUNTER — NON-APPOINTMENT (OUTPATIENT)
Age: 61
End: 2021-08-10

## 2021-08-24 ENCOUNTER — APPOINTMENT (OUTPATIENT)
Dept: ORTHOPEDIC SURGERY | Facility: CLINIC | Age: 61
End: 2021-08-24

## 2021-09-21 ENCOUNTER — APPOINTMENT (OUTPATIENT)
Dept: ORTHOPEDIC SURGERY | Facility: CLINIC | Age: 61
End: 2021-09-21
Payer: MEDICARE

## 2021-09-21 VITALS — SYSTOLIC BLOOD PRESSURE: 128 MMHG | HEART RATE: 55 BPM | DIASTOLIC BLOOD PRESSURE: 79 MMHG

## 2021-09-21 PROCEDURE — 99024 POSTOP FOLLOW-UP VISIT: CPT

## 2021-09-21 PROCEDURE — 73562 X-RAY EXAM OF KNEE 3: CPT | Mod: RT

## 2021-09-21 RX ORDER — POTASSIUM CHLORIDE 10 MEQ
CAPSULE, EXTENDED RELEASE ORAL
Refills: 0 | Status: DISCONTINUED | COMMUNITY
End: 2021-09-21

## 2021-09-21 RX ORDER — CEPHALEXIN 500 MG/1
500 TABLET ORAL
Qty: 4 | Refills: 0 | Status: DISCONTINUED | COMMUNITY
Start: 2021-08-06 | End: 2021-09-21

## 2021-09-21 RX ORDER — NAPROXEN 500 MG/1
TABLET ORAL
Refills: 0 | Status: DISCONTINUED | COMMUNITY
End: 2021-09-21

## 2021-09-21 RX ORDER — PREGABALIN 50 MG/1
50 CAPSULE ORAL
Refills: 0 | Status: DISCONTINUED | COMMUNITY
End: 2021-09-21

## 2021-09-21 RX ORDER — PREDNISONE 50 MG/1
TABLET ORAL
Refills: 0 | Status: DISCONTINUED | COMMUNITY
End: 2021-09-21

## 2021-09-21 RX ORDER — DICLOFENAC SODIUM 75 MG/1
75 TABLET, DELAYED RELEASE ORAL
Qty: 20 | Refills: 1 | Status: DISCONTINUED | COMMUNITY
Start: 2020-11-23 | End: 2021-09-21

## 2021-09-21 RX ORDER — OXYCODONE 5 MG/1
5 TABLET ORAL
Qty: 40 | Refills: 0 | Status: DISCONTINUED | COMMUNITY
Start: 2021-07-30 | End: 2021-09-21

## 2021-12-28 ENCOUNTER — APPOINTMENT (OUTPATIENT)
Dept: ORTHOPEDIC SURGERY | Facility: CLINIC | Age: 61
End: 2021-12-28
Payer: MEDICARE

## 2021-12-28 VITALS — DIASTOLIC BLOOD PRESSURE: 82 MMHG | SYSTOLIC BLOOD PRESSURE: 134 MMHG | HEART RATE: 80 BPM

## 2021-12-28 DIAGNOSIS — M25.562 PAIN IN LEFT KNEE: ICD-10-CM

## 2021-12-28 DIAGNOSIS — T84.062A: ICD-10-CM

## 2021-12-28 PROCEDURE — 99214 OFFICE O/P EST MOD 30 MIN: CPT

## 2021-12-28 PROCEDURE — 73562 X-RAY EXAM OF KNEE 3: CPT | Mod: LT

## 2021-12-28 PROCEDURE — 99072 ADDL SUPL MATRL&STAF TM PHE: CPT

## 2022-01-17 ENCOUNTER — APPOINTMENT (OUTPATIENT)
Dept: ORTHOPEDIC SURGERY | Facility: CLINIC | Age: 62
End: 2022-01-17
Payer: MEDICARE

## 2022-01-17 VITALS
HEIGHT: 63 IN | SYSTOLIC BLOOD PRESSURE: 124 MMHG | DIASTOLIC BLOOD PRESSURE: 73 MMHG | BODY MASS INDEX: 47.48 KG/M2 | WEIGHT: 268 LBS | HEART RATE: 64 BPM

## 2022-01-17 DIAGNOSIS — M47.812 SPONDYLOSIS W/OUT MYELOPATHY OR RADICULOPATHY, CERVICAL REGION: ICD-10-CM

## 2022-01-17 DIAGNOSIS — M51.24 OTHER INTERVERTEBRAL DISC DISPLACEMENT, THORACIC REGION: ICD-10-CM

## 2022-01-17 DIAGNOSIS — M54.2 CERVICALGIA: ICD-10-CM

## 2022-01-17 PROCEDURE — 72050 X-RAY EXAM NECK SPINE 4/5VWS: CPT

## 2022-01-17 PROCEDURE — 99072 ADDL SUPL MATRL&STAF TM PHE: CPT

## 2022-01-17 PROCEDURE — 99214 OFFICE O/P EST MOD 30 MIN: CPT

## 2022-02-25 ENCOUNTER — APPOINTMENT (OUTPATIENT)
Dept: ORTHOPEDIC SURGERY | Facility: CLINIC | Age: 62
End: 2022-02-25

## 2022-02-26 NOTE — PHYSICAL THERAPY INITIAL EVALUATION ADULT - FOLLOWS COMMANDS/ANSWERS QUESTIONS, REHAB EVAL
56yo male had recent MRI that showed multiple strokes. Advised by dr to come to the ED for work up. pt denies chest pain SOB, and any eye disturbances. VS ANTONIOL. 100% of the time

## 2022-04-19 ENCOUNTER — APPOINTMENT (OUTPATIENT)
Dept: ORTHOPEDIC SURGERY | Facility: CLINIC | Age: 62
End: 2022-04-19

## 2022-04-22 ENCOUNTER — APPOINTMENT (OUTPATIENT)
Dept: ORTHOPEDIC SURGERY | Facility: CLINIC | Age: 62
End: 2022-04-22
Payer: SELF-PAY

## 2022-04-22 VITALS — WEIGHT: 268 LBS | BODY MASS INDEX: 47.48 KG/M2 | HEIGHT: 63 IN

## 2022-04-22 DIAGNOSIS — Z96.651 PRESENCE OF RIGHT ARTIFICIAL KNEE JOINT: ICD-10-CM

## 2022-04-22 PROCEDURE — 99212 OFFICE O/P EST SF 10 MIN: CPT

## 2022-04-22 PROCEDURE — 73562 X-RAY EXAM OF KNEE 3: CPT | Mod: RT

## 2022-04-22 NOTE — HISTORY OF PRESENT ILLNESS
[de-identified] : The patient is a 62-year-old female well-known to this office.  She status post revision right total knee replacement done July 12, 2021.  Patient was doing exceptionally well several months past surgery however the past couple of weeks she been complaining of some discomfort with ascending and descending stairs getting up from a seated position.  She denies any recent dental work.  She denies any injury or trauma.  She is here for follow-up visit [Improving] : improving [3] : a current pain level of 3/10 [2] : an average pain level of 2/10 [1] : a minimum pain level of 1/10 [4] : a maximum pain level of 4/10 [Bending] : worsened by bending [Sitting] : worsened by sitting [Knee Flexion] : worsened with knee flexion [Acetaminophen] : relieved by acetaminophen [Exercise Regimen] : relieved by exercise regimen [Heat] : relieved by heat [Ice] : relieved by ice [Physical Therapy] : relieved by physical therapy [Rest] : relieved by rest [Ataxia] : no ataxia [Incontinence] : no incontinence [Loss of Dexterity] : good dexterity [Urinary Ret.] : no urinary retention

## 2022-04-22 NOTE — DISCUSSION/SUMMARY
[Medication Risks Reviewed] : Medication risks reviewed [de-identified] : The patient will continue an exercise program of walking, strength training.  She will limit some of her flexion exercises over the next several weeks.  She will continue with anti-inflammatories, local patches and other conservative treatment modalities.  Patient will follow-up in several weeks.  All of her questions were answered to her satisfaction.

## 2022-04-22 NOTE — REVIEW OF SYSTEMS
[Arthralgia] : arthralgia [Joint Pain] : no joint pain [Joint Stiffness] : joint stiffness [Joint Swelling] : joint swelling [Negative] : Endocrine

## 2022-04-22 NOTE — PHYSICAL EXAM
[UE/LE] : Sensory: Intact in bilateral upper & lower extremities [ALL] : dorsalis pedis, posterior tibial, femoral, popliteal, and radial 2+ and symmetric bilaterally [Normal RUE] : Right Upper Extremity: No scars, rashes, lesions, ulcers, skin intact [Normal LUE] : Left Upper Extremity: No scars, rashes, lesions, ulcers, skin intact [Normal RLE] : Right Lower Extremity: No scars, rashes, lesions, ulcers, skin intact [Normal LLE] : Left Lower Extremity: No scars, rashes, lesions, ulcers, skin intact [Normal] : No costovertebral angle tenderness and no spinal tenderness [de-identified] : Well-healed surgical scar to the right knee without erythema, drainage, or evidence of cellulitis.  Less than 5 degree laxity with varus valgus stresses.  Negative anteroposterior drawer.  No extension lag.  No flexion contractures.  Range of motion 0-1 35.  Her calves are soft, nontender, no evidence of DVT at this time.  Patient has good motor and sensory to both legs. [de-identified] : Intact [de-identified] : Revision right total knee replacement, and anatomical alignment, excellent bone to prosthesis interface, there is no gross evidence of prosthetic failure, there is no gross evidence of fractures, all 3 components anatomically aligned.

## 2022-04-22 NOTE — REASON FOR VISIT
[Follow-Up Visit] : a follow-up visit for [Artificial Knee Joint] : artificial knee joint [FreeTextEntry2] : Right TKR 7/2021. Finished PT a few months ago and was doing well. Pain and clicking in right knee started about 2 months ago without injury.

## 2022-04-22 NOTE — END OF VISIT
[FreeTextEntry3] : I Dr Low  personally performed the evaluation and management services for this new/established  patient. This includes conducting the initial examination, assessing all conditions, and establishing the plan of care. Today, my ACP Levi Ghosh was here to observe my evaluation and management services for this patient to be followed going forward.\par \par Erickson Cole M.D\par \par

## 2022-05-02 NOTE — H&P PST ADULT - AS BP NONINV METHOD
Please call Dr Rebekah Arevalo office  [facility] [address]  Their # is [phone]  Please find out when patient was there last  We received note from their office  Not sure if it was from 2019 or 2020? Has she been seen since? If not , call her to schedule Diabetic eye exam  Thanks 
electronic

## 2022-05-12 NOTE — PATIENT PROFILE ADULT - HARM RISK FACTORS
Called patient and scheduled surgery for 6/1 with Dr. Srinivasan.    H&P with PCP.    COVID test will be with PCP. Offered to schedule in [place] but pt would like to do it with her doctor. Explained the COVID test needs to be completed within 4 days of the procedure.     Post-op scheduled for 6/15.     Pt is unsure if she has a surgery packet. She said she has the soap, but does not think she has any information for surgery.    no

## 2023-02-22 NOTE — DISCHARGE NOTE NURSING/CASE MANAGEMENT/SOCIAL WORK - HISTORY OF COVID-19 VACCINATION
Last OV 12/2722  No future appts scheduled    Medication is on medlist, but no last refill date (it was refilled on 2018, but not refilled by us since then) No

## 2023-03-03 NOTE — ED ADULT TRIAGE NOTE - HEIGHT IN INCHES
Called and talked to patient went over clear liquids for 24 hours then to start the BRAT diet then if better OK to resume diet.
Pt has had diarrhea for about 3-4 days now.  Pt has been taking imodium and it hasnt been helping
3

## 2023-03-31 ENCOUNTER — APPOINTMENT (OUTPATIENT)
Dept: OBGYN | Facility: CLINIC | Age: 63
End: 2023-03-31

## 2023-04-07 ENCOUNTER — APPOINTMENT (OUTPATIENT)
Dept: OBGYN | Facility: CLINIC | Age: 63
End: 2023-04-07
Payer: COMMERCIAL

## 2023-04-07 VITALS
HEIGHT: 63 IN | RESPIRATION RATE: 16 BRPM | HEART RATE: 60 BPM | WEIGHT: 268 LBS | SYSTOLIC BLOOD PRESSURE: 122 MMHG | DIASTOLIC BLOOD PRESSURE: 70 MMHG | OXYGEN SATURATION: 98 % | BODY MASS INDEX: 47.48 KG/M2

## 2023-04-07 DIAGNOSIS — E66.01 MORBID (SEVERE) OBESITY DUE TO EXCESS CALORIES: ICD-10-CM

## 2023-04-07 DIAGNOSIS — L74.9 ECCRINE SWEAT DISORDER, UNSPECIFIED: ICD-10-CM

## 2023-04-07 DIAGNOSIS — Z12.39 ENCOUNTER FOR OTHER SCREENING FOR MALIGNANT NEOPLASM OF BREAST: ICD-10-CM

## 2023-04-07 DIAGNOSIS — N39.3 STRESS INCONTINENCE (FEMALE) (MALE): ICD-10-CM

## 2023-04-07 LAB
BILIRUB UR QL STRIP: NORMAL
CLARITY UR: CLEAR
COLLECTION METHOD: NORMAL
GLUCOSE UR-MCNC: NORMAL
HCG UR QL: 0.2 EU/DL
HGB UR QL STRIP.AUTO: NORMAL
KETONES UR-MCNC: NORMAL
LEUKOCYTE ESTERASE UR QL STRIP: NORMAL
NITRITE UR QL STRIP: NORMAL
PH UR STRIP: 5.5
PROT UR STRIP-MCNC: NORMAL
SP GR UR STRIP: 1.02

## 2023-04-07 PROCEDURE — 76857 US EXAM PELVIC LIMITED: CPT

## 2023-04-07 PROCEDURE — 99396 PREV VISIT EST AGE 40-64: CPT | Mod: 25

## 2023-04-07 PROCEDURE — ZZZZZ: CPT

## 2023-04-07 PROCEDURE — 76830 TRANSVAGINAL US NON-OB: CPT

## 2023-04-07 PROCEDURE — 81003 URINALYSIS AUTO W/O SCOPE: CPT | Mod: QW

## 2023-04-07 NOTE — COUNSELING
[Nutrition/ Exercise/ Weight Management] : nutrition, exercise, weight management [Body Image] : body image [Vitamins/Supplements] : vitamins/supplements [Sunscreen] : sunscreen [Breast Self Exam] : breast self exam [Bladder Hygiene] : bladder hygiene [Other ___] : [unfilled]

## 2023-04-07 NOTE — PLAN
[FreeTextEntry1] : 1)  Self breast exam instructions, calcium supplementation discussed with the patient.\par 2)  Mammography, lipid profile assessment, TSH screening, fasting glucose testing, colonoscopy screening were discussed with the patient.  Vitamin D supplementation\par 3)  Maintain healthy weight.\par 4)  Regular health maintenance with PCP.\par 5)  Remain tobacco free.\par 6)  Limit alcohol intake to less than 5 drinks per week.\par 7)  Osteoporosis screening.\par 8)  Annual cholesterol screening.\par 9)  Annual influenza vaccine.The importance of routine physical activity was reviewed and a goal of 150 minutes of moderately vigorous exercise per week was endorsed.\par Yearly breast cancer screening with no current clinical or radiographic concerns.  The patient was reminded regarding future well breast and general healthcare, breast cancer risk reduction, the importance of self-examination and the need for follow up.   She was again reminded of the need to take Vit D3 2500  IU daily or to keep a Vit D level above 30, and Tumeric 1000 mg daily with Black Pepper.  Plan continued yearly imaging and breast follow up, sooner as needed.  I counseled the patient on current recommendations to reduce breast cancer risk including but not inclusive to regular exercise 20-30 minutes 3-4 times a week, low fat diet, limiting alcohol consumption, maintenance of ideal body weight, yearly imaging and self breast awareness.  Questions answered.  I encouraged in light of Covid 19, social distancing, frequent hand washing and precautions to stay healthy.\par \par Evaluated by endocrine for excessive sweating.\par Patient is referred to urogynecology for evaluation of her stress urinary incontinence and possible urinary retention.  The need for weight loss was stressed.  All questions were addressed.

## 2023-04-07 NOTE — PHYSICAL EXAM
[Appropriately responsive] : appropriately responsive [Alert] : alert [No Acute Distress] : no acute distress [No Lymphadenopathy] : no lymphadenopathy [No Murmurs] : no murmurs [Soft] : soft [Non-tender] : non-tender [Non-distended] : non-distended [No HSM] : No HSM [No Lesions] : no lesions [No Mass] : no mass [Oriented x3] : oriented x3 [Examination Of The Breasts] : a normal appearance [No Masses] : no breast masses were palpable [Labia Majora] : normal [Labia Minora] : normal [Normal] : normal [Absent] : absent [Uterine Adnexae] : normal [FreeTextEntry8] : No adnexal masses appreciated.

## 2023-04-07 NOTE — HISTORY OF PRESENT ILLNESS
[N] : Patient is not sexually active [LMP unknown] : LMP unknown [Hot Flashes] : hot flashes [Night Sweats] : night sweats [unknown] : Patient is unsure of the date of her LMP [Experiencing Menopausal Sxs] : experiencing menopausal symptoms [Previously active] : previously active [FreeTextEntry1] : pt is here for annual.\par The patient presents today for a routine GYN exam.  She reports that ever since she had her obstetrical hemorrhage she reports excess sweating.  We discussed various etiologies which included premature ovarian failure.  The patient underwent a supracervical hysterectomy at that time and her menses ceased.  Therefore we are unable to document if she went through premature menopause.  However now at age 63 regardless of the etiology they still have not identified a cause for her excess sweating..  The patient also reports stress urinary incontinence and symptoms of urinary retention.  We reviewed together in detail her past medical and surgical histories, allergies and medication usage, social and family history.   All questions were answered in easy to understand language.\par  [Mammogramdate] : 08/22 [BreastSonogramDate] : 08/22 [PapSmeardate] : 12/22 [BoneDensityDate] : 04/23 [ColonoscopyDate] : 06/22

## 2023-04-10 LAB
HPV 16 E6+E7 MRNA CVX QL NAA+PROBE: NOT DETECTED
HPV18+45 E6+E7 MRNA CVX QL NAA+PROBE: NOT DETECTED

## 2023-04-12 LAB — CYTOLOGY CVX/VAG DOC THIN PREP: NORMAL

## 2023-05-04 NOTE — ED ADULT NURSE NOTE - NURSING ED SKIN COLOR
Is This A New Presentation, Or A Follow-Up?: Rash redness, swelling and warmth to right lower leg/normal for race

## 2023-05-17 NOTE — PROGRESS NOTE ADULT - SKIN
Detail Level: Detailed Add 43637 Cpt? (Important Note: In 2017 The Use Of 57051 Is Being Tracked By Cms To Determine Future Global Period Reimbursement For Global Periods): no right LE-improving redness and swelling. detailed exam

## 2023-10-17 NOTE — PATIENT PROFILE ADULT - NSPROGENPREVTRANSF_GEN_A_NUR
Spoke with pt regarding 0600 arrival time and location of procedure. Pt denies questions or concerns regarding prep. Pt verbally confirmed 0600 arrival time.    no history of blood product transfusion

## 2024-04-16 DIAGNOSIS — Z12.11 ENCOUNTER FOR SCREENING FOR MALIGNANT NEOPLASM OF COLON: ICD-10-CM

## 2024-04-16 DIAGNOSIS — Z13.820 ENCOUNTER FOR SCREENING FOR OSTEOPOROSIS: ICD-10-CM

## 2024-04-16 DIAGNOSIS — Z11.3 ENCOUNTER FOR SCREENING FOR INFECTIONS WITH A PREDOMINANTLY SEXUAL MODE OF TRANSMISSION: ICD-10-CM

## 2024-04-22 ENCOUNTER — LABORATORY RESULT (OUTPATIENT)
Age: 64
End: 2024-04-22

## 2024-04-22 ENCOUNTER — APPOINTMENT (OUTPATIENT)
Dept: OBGYN | Facility: CLINIC | Age: 64
End: 2024-04-22
Payer: COMMERCIAL

## 2024-04-22 VITALS
BODY MASS INDEX: 39.34 KG/M2 | WEIGHT: 222 LBS | RESPIRATION RATE: 16 BRPM | OXYGEN SATURATION: 96 % | HEIGHT: 63 IN | SYSTOLIC BLOOD PRESSURE: 132 MMHG | HEART RATE: 75 BPM | DIASTOLIC BLOOD PRESSURE: 70 MMHG

## 2024-04-22 DIAGNOSIS — R63.4 ABNORMAL WEIGHT LOSS: ICD-10-CM

## 2024-04-22 DIAGNOSIS — Z12.39 ENCOUNTER FOR OTHER SCREENING FOR MALIGNANT NEOPLASM OF BREAST: ICD-10-CM

## 2024-04-22 DIAGNOSIS — Z01.419 ENCOUNTER FOR GYNECOLOGICAL EXAMINATION (GENERAL) (ROUTINE) W/OUT ABNORMAL FINDINGS: ICD-10-CM

## 2024-04-22 DIAGNOSIS — R11.0 NAUSEA: ICD-10-CM

## 2024-04-22 DIAGNOSIS — Z87.19 PERSONAL HISTORY OF OTHER DISEASES OF THE DIGESTIVE SYSTEM: ICD-10-CM

## 2024-04-22 DIAGNOSIS — Z12.4 ENCOUNTER FOR SCREENING FOR MALIGNANT NEOPLASM OF CERVIX: ICD-10-CM

## 2024-04-22 DIAGNOSIS — Z13.31 ENCOUNTER FOR SCREENING FOR DEPRESSION: ICD-10-CM

## 2024-04-22 PROCEDURE — ZZZZZ: CPT

## 2024-04-22 PROCEDURE — 99459 PELVIC EXAMINATION: CPT

## 2024-04-22 PROCEDURE — 99396 PREV VISIT EST AGE 40-64: CPT

## 2024-04-22 PROCEDURE — 76857 US EXAM PELVIC LIMITED: CPT

## 2024-04-22 PROCEDURE — 76830 TRANSVAGINAL US NON-OB: CPT

## 2024-04-22 RX ORDER — CYANOCOBALAMIN (VITAMIN B-12) 1000 MCG
1000 TABLET ORAL
Refills: 0 | Status: ACTIVE | COMMUNITY

## 2024-04-22 RX ORDER — CEPHALEXIN 500 MG/1
500 CAPSULE ORAL
Qty: 4 | Refills: 0 | Status: DISCONTINUED | COMMUNITY
Start: 2021-08-10 | End: 2024-04-22

## 2024-04-22 RX ORDER — METFORMIN ER 750 MG 750 MG/1
750 TABLET ORAL
Refills: 0 | Status: ACTIVE | COMMUNITY

## 2024-04-22 RX ORDER — EZETIMIBE 10 MG/1
10 TABLET ORAL
Refills: 0 | Status: ACTIVE | COMMUNITY

## 2024-04-22 RX ORDER — METFORMIN HYDROCHLORIDE 625 MG/1
TABLET ORAL
Refills: 0 | Status: DISCONTINUED | COMMUNITY
End: 2024-04-22

## 2024-04-22 RX ORDER — LEVOTHYROXINE SODIUM 125 UG/1
125 TABLET ORAL
Refills: 0 | Status: ACTIVE | COMMUNITY

## 2024-04-22 RX ORDER — ATORVASTATIN CALCIUM 40 MG/1
40 TABLET, FILM COATED ORAL
Refills: 0 | Status: ACTIVE | COMMUNITY

## 2024-04-22 RX ORDER — PREGABALIN 100 MG/1
100 CAPSULE ORAL
Qty: 90 | Refills: 0 | Status: DISCONTINUED | COMMUNITY
Start: 2021-10-15 | End: 2024-04-22

## 2024-04-22 RX ORDER — FLUOXETINE HYDROCHLORIDE 20 MG/1
20 CAPSULE ORAL
Refills: 0 | Status: ACTIVE | COMMUNITY

## 2024-04-22 RX ORDER — TOPIRAMATE 200 MG/1
200 TABLET ORAL
Refills: 0 | Status: ACTIVE | COMMUNITY

## 2024-04-22 RX ORDER — LISINOPRIL 30 MG/1
TABLET ORAL
Refills: 0 | Status: DISCONTINUED | COMMUNITY
End: 2024-04-22

## 2024-04-22 RX ORDER — LEVOTHYROXINE SODIUM 0.17 MG/1
TABLET ORAL
Refills: 0 | Status: DISCONTINUED | COMMUNITY
End: 2024-04-22

## 2024-04-22 RX ORDER — VITAMIN K2 90 MCG
125 MCG CAPSULE ORAL
Refills: 0 | Status: ACTIVE | COMMUNITY

## 2024-04-22 RX ORDER — ATORVASTATIN CALCIUM 80 MG/1
TABLET, FILM COATED ORAL
Refills: 0 | Status: DISCONTINUED | COMMUNITY
End: 2024-04-22

## 2024-04-22 NOTE — PHYSICAL EXAM
[Chaperone Present] : A chaperone was present in the examining room during all aspects of the physical examination [25958] : A chaperone was present during the pelvic exam. [Appropriately responsive] : appropriately responsive [Alert] : alert [No Acute Distress] : no acute distress [No Lymphadenopathy] : no lymphadenopathy [Soft] : soft [Non-tender] : non-tender [Non-distended] : non-distended [No HSM] : No HSM [No Lesions] : no lesions [No Mass] : no mass [Oriented x3] : oriented x3 [Examination Of The Breasts] : a normal appearance [No Masses] : no breast masses were palpable [Labia Majora] : normal [Labia Minora] : normal [Normal] : normal [Absent] : absent [Uterine Adnexae] : normal [FreeTextEntry2] : Polly Elder [FreeTextEntry3] : This note was written by Polly Elder on 04/22/2024, acting as a scribe for Dr. Nader Blair MD. All medic record entries were at my, Dr. Nader Blair MD, direction and personally dictated by me in 04/22/2024. I have personally reviewed the chart and agree that the record accurately reflects my personal performance of the history, physical exam, assessment, and plan.  [FreeTextEntry9] : rectal deferred - upcoming colonoscopy

## 2024-04-22 NOTE — PROCEDURE
[Cervical Pap Smear] : cervical Pap smear [Liquid Base] : liquid base [Tolerated Well] : the patient tolerated the procedure well [No Complications] : there were no complications [Pelvic Pain] : pelvic pain [Transvaginal Ultrasound] : transvaginal ultrasound

## 2024-04-22 NOTE — HISTORY OF PRESENT ILLNESS
[N] : Patient is not sexually active [Y] : Positive pregnancy history [Hot Flashes] : hot flashes [Night Sweats] : night sweats [Insomnia] : insomnia Aileen [Previously active] : previously active [Men] : men [No] : No [TextBox_4] : The patient presents today for a routine GYN exam. She offers no GYN complaints, although notes severe nausea to the point where she's lost 60 pounds since August 2023. She is currently seeing a specialist and getting appropriate scans/tests done. We reviewed together in detail her past medical and surgical histories, allergies and medication usage, social and family history. All questions were answered in easy-to-understand language. [Mammogramdate] : 06/2023 [BreastSonogramDate] : 06/2023 [PapSmeardate] : 04/2023 [BoneDensityDate] : 01/2023 [ColonoscopyDate] : 08/2021 [TextBox_78] : no hx [PGHxTotal] : 2 [[facility] Center[facility]erm] : 1 [[facility] Hospital[facility]] : 1 [FreeTextEntry1] : 1 miscarriage

## 2024-04-22 NOTE — PLAN
[FreeTextEntry1] : 1)  Self breast exam instructions, mammography discussed with the patient. 2)  Lipid profile assessment, TSH screening, fasting glucose testing, and vitamin D supplementation were discussed with the patient. 3)  Maintain healthy weight. 4)  Regular health maintenance with PCP. 5)  Remain tobacco free. 6)  Limit alcohol intake to less than 5 drinks per week. 7)  Osteoporosis screening and colonoscopy screening. 8)  Annual cholesterol screening. 9)  Annual influenza vaccine. 10) The importance of routine physical activity was reviewed and a goal of 150 minutes of moderately vigorous exercise per week was endorsed.   Yearly breast cancer screening with no current clinical or radiographic concerns. The patient was reminded regarding future well breast and general healthcare, breast cancer risk reduction, the importance of self-examination and the need for follow up. She was again reminded of the need to take Vit D3 2500 IU daily or to keep a Vit D level above 30, and Tumeric 1000 mg daily with Black Pepper. Plan continued yearly imaging and breast follow up, sooner as needed. I counseled the patient on current recommendations to reduce breast cancer risk including but not inclusive to regular exercise 20-30 minutes 3-4 times a week, low fat diet, limiting alcohol consumption, maintenance of ideal body weight, yearly imaging and self-breast awareness. Questions answered. I encouraged in light of COVID-19, social distancing, frequent hand washing and precautions to stay healthy.   I have spent 40 minutes of time on this encounter. Greater than 50% of the face-to-face encounter time was spent on counseling and/or coordination of care for examination findings, differential, testing, management and planning.

## 2024-04-23 NOTE — H&P PST ADULT - ENERGY EXPENDITURE (METS)
Received fax from [facility] patient visit note date of service 04/22/24.  Fax placed on Dr. Holguin's desk for review.      >4

## 2024-04-24 LAB
CYTOLOGY CVX/VAG DOC THIN PREP: NORMAL
HPV 16 E6+E7 MRNA CVX QL NAA+PROBE: NOT DETECTED
HPV18+45 E6+E7 MRNA CVX QL NAA+PROBE: NOT DETECTED

## 2024-05-26 NOTE — PHARMACOTHERAPY INTERVENTION NOTE - OUTCOME
accepted
Admission Reconciliation is Completed  Discharge Reconciliation is Not Complete
Admission Reconciliation is Completed  Discharge Reconciliation is Completed

## 2024-10-10 NOTE — ED ADULT NURSE NOTE - CADM POA URETHRAL CATHETER
Chronic, controlled   Lab Results   Component Value Date    HGBA1C 5.7 (H) 09/03/2024     - continue metformin   No

## 2024-10-23 NOTE — CONSULT NOTE ADULT - PROBLEM SELECTOR RECOMMENDATION 9
[Initial Evaluation] : an initial evaluation [Mother] : mother [FreeTextEntry1] : right wrist injury sustained on 09/25/2024. s/p revision, asymptomatic post-op, pain control, bowel regimen, IVF hydration, I & O monitoring, and incentive spirometry. PT & OT consults, mobilization & weight bearing as per orthopedic surgery team.

## 2025-06-23 ENCOUNTER — APPOINTMENT (OUTPATIENT)
Dept: OBGYN | Facility: CLINIC | Age: 65
End: 2025-06-23
Payer: COMMERCIAL

## 2025-06-23 ENCOUNTER — NON-APPOINTMENT (OUTPATIENT)
Age: 65
End: 2025-06-23

## 2025-06-23 VITALS
OXYGEN SATURATION: 98 % | SYSTOLIC BLOOD PRESSURE: 118 MMHG | HEART RATE: 71 BPM | BODY MASS INDEX: 39.87 KG/M2 | DIASTOLIC BLOOD PRESSURE: 62 MMHG | RESPIRATION RATE: 14 BRPM | HEIGHT: 63 IN | WEIGHT: 225 LBS

## 2025-06-23 PROCEDURE — 76857 US EXAM PELVIC LIMITED: CPT

## 2025-06-23 PROCEDURE — 99397 PER PM REEVAL EST PAT 65+ YR: CPT

## 2025-06-23 PROCEDURE — 76830 TRANSVAGINAL US NON-OB: CPT

## 2025-06-23 PROCEDURE — ZZZZZ: CPT

## 2025-06-23 PROCEDURE — 82270 OCCULT BLOOD FECES: CPT

## 2025-06-23 RX ORDER — LEVOTHYROXINE SODIUM 0.11 MG/1
112 TABLET ORAL
Refills: 0 | Status: ACTIVE | COMMUNITY

## 2025-06-23 RX ORDER — CELECOXIB 200 MG/1
200 CAPSULE ORAL
Refills: 0 | Status: ACTIVE | COMMUNITY

## 2025-06-24 LAB — HPV HIGH+LOW RISK DNA PNL CVX: NOT DETECTED

## 2025-06-25 LAB — CYTOLOGY CVX/VAG DOC THIN PREP: NORMAL

## 2025-06-30 NOTE — ED ADULT NURSE NOTE - PAIN RATING/NUMBER SCALE (0-10): REST
Post Procedure Call Completed on 6/30 at 1208    Left message with [employer] Mannie regarding their recent IR procedure:  Y-90/Mapping.    Patient given IR department call back number: [phone]          5